# Patient Record
Sex: FEMALE | Race: BLACK OR AFRICAN AMERICAN | Employment: OTHER | URBAN - METROPOLITAN AREA
[De-identification: names, ages, dates, MRNs, and addresses within clinical notes are randomized per-mention and may not be internally consistent; named-entity substitution may affect disease eponyms.]

---

## 2017-03-09 ENCOUNTER — HOSPITAL ENCOUNTER (EMERGENCY)
Age: 82
Discharge: HOME | End: 2017-03-09
Attending: STUDENT IN AN ORGANIZED HEALTH CARE EDUCATION/TRAINING PROGRAM
Payer: MEDICARE

## 2017-03-09 ENCOUNTER — APPOINTMENT (OUTPATIENT)
Dept: GENERAL RADIOLOGY | Age: 82
End: 2017-03-09
Attending: STUDENT IN AN ORGANIZED HEALTH CARE EDUCATION/TRAINING PROGRAM
Payer: MEDICARE

## 2017-03-09 ENCOUNTER — APPOINTMENT (OUTPATIENT)
Dept: CT IMAGING | Age: 82
End: 2017-03-09
Attending: STUDENT IN AN ORGANIZED HEALTH CARE EDUCATION/TRAINING PROGRAM
Payer: MEDICARE

## 2017-03-09 VITALS
TEMPERATURE: 97.9 F | HEART RATE: 64 BPM | BODY MASS INDEX: 20.49 KG/M2 | HEIGHT: 64 IN | OXYGEN SATURATION: 100 % | WEIGHT: 120 LBS | SYSTOLIC BLOOD PRESSURE: 153 MMHG | DIASTOLIC BLOOD PRESSURE: 63 MMHG

## 2017-03-09 DIAGNOSIS — R42 DIZZINESS: Primary | ICD-10-CM

## 2017-03-09 LAB
ALBUMIN SERPL BCP-MCNC: 3.4 G/DL (ref 3.5–5)
ALBUMIN/GLOB SERPL: 0.7 {RATIO} (ref 1.1–2.2)
ALP SERPL-CCNC: 65 U/L (ref 45–117)
ALT SERPL-CCNC: 34 U/L (ref 12–78)
ANION GAP BLD CALC-SCNC: 8 MMOL/L (ref 5–15)
APPEARANCE UR: CLEAR
AST SERPL W P-5'-P-CCNC: 32 U/L (ref 15–37)
ATRIAL RATE: 63 BPM
BASOPHILS # BLD AUTO: 0 K/UL (ref 0–0.1)
BASOPHILS # BLD: 1 % (ref 0–1)
BILIRUB SERPL-MCNC: 0.1 MG/DL (ref 0.2–1)
BILIRUB UR QL: NEGATIVE
BUN SERPL-MCNC: 9 MG/DL (ref 6–20)
BUN/CREAT SERPL: 11 (ref 12–20)
CALCIUM SERPL-MCNC: 9.2 MG/DL (ref 8.5–10.1)
CALCULATED R AXIS, ECG10: -24 DEGREES
CALCULATED T AXIS, ECG11: 40 DEGREES
CHLORIDE SERPL-SCNC: 105 MMOL/L (ref 97–108)
CO2 SERPL-SCNC: 27 MMOL/L (ref 21–32)
COLOR UR: NORMAL
CREAT SERPL-MCNC: 0.82 MG/DL (ref 0.55–1.02)
DIAGNOSIS, 93000: NORMAL
EOSINOPHIL # BLD: 0.1 K/UL (ref 0–0.4)
EOSINOPHIL NFR BLD: 2 % (ref 0–7)
ERYTHROCYTE [DISTWIDTH] IN BLOOD BY AUTOMATED COUNT: 13.8 % (ref 11.5–14.5)
GLOBULIN SER CALC-MCNC: 4.9 G/DL (ref 2–4)
GLUCOSE SERPL-MCNC: 94 MG/DL (ref 65–100)
GLUCOSE UR STRIP.AUTO-MCNC: NEGATIVE MG/DL
HCT VFR BLD AUTO: 33.1 % (ref 35–47)
HGB BLD-MCNC: 10.3 G/DL (ref 11.5–16)
HGB UR QL STRIP: NEGATIVE
INR PPP: 1.1 (ref 0.9–1.1)
KETONES UR QL STRIP.AUTO: NEGATIVE MG/DL
LEUKOCYTE ESTERASE UR QL STRIP.AUTO: NEGATIVE
LYMPHOCYTES # BLD AUTO: 40 % (ref 12–49)
LYMPHOCYTES # BLD: 2.1 K/UL (ref 0.8–3.5)
MCH RBC QN AUTO: 29.7 PG (ref 26–34)
MCHC RBC AUTO-ENTMCNC: 31.1 G/DL (ref 30–36.5)
MCV RBC AUTO: 95.4 FL (ref 80–99)
MONOCYTES # BLD: 0.5 K/UL (ref 0–1)
MONOCYTES NFR BLD AUTO: 9 % (ref 5–13)
NEUTS SEG # BLD: 2.6 K/UL (ref 1.8–8)
NEUTS SEG NFR BLD AUTO: 48 % (ref 32–75)
NITRITE UR QL STRIP.AUTO: NEGATIVE
P-R INTERVAL, ECG05: 196 MS
PH UR STRIP: 7.5 [PH] (ref 5–8)
PHENYTOIN SERPL-MCNC: 6.3 UG/ML (ref 10–20)
PLATELET # BLD AUTO: 191 K/UL (ref 150–400)
POTASSIUM SERPL-SCNC: 3.8 MMOL/L (ref 3.5–5.1)
PROT SERPL-MCNC: 8.3 G/DL (ref 6.4–8.2)
PROT UR STRIP-MCNC: NEGATIVE MG/DL
PROTHROMBIN TIME: 10.6 SEC (ref 9–11.1)
Q-T INTERVAL, ECG07: 424 MS
QRS DURATION, ECG06: 84 MS
QTC CALCULATION (BEZET), ECG08: 467 MS
RBC # BLD AUTO: 3.47 M/UL (ref 3.8–5.2)
SODIUM SERPL-SCNC: 140 MMOL/L (ref 136–145)
SP GR UR REFRACTOMETRY: 1.01 (ref 1–1.03)
TROPONIN I SERPL-MCNC: <0.04 NG/ML
UROBILINOGEN UR QL STRIP.AUTO: 0.2 EU/DL (ref 0.2–1)
VENTRICULAR RATE, ECG03: 73 BPM
WBC # BLD AUTO: 5.3 K/UL (ref 3.6–11)

## 2017-03-09 PROCEDURE — 71010 XR CHEST PORT: CPT

## 2017-03-09 PROCEDURE — 80185 ASSAY OF PHENYTOIN TOTAL: CPT | Performed by: STUDENT IN AN ORGANIZED HEALTH CARE EDUCATION/TRAINING PROGRAM

## 2017-03-09 PROCEDURE — 85025 COMPLETE CBC W/AUTO DIFF WBC: CPT | Performed by: STUDENT IN AN ORGANIZED HEALTH CARE EDUCATION/TRAINING PROGRAM

## 2017-03-09 PROCEDURE — 80053 COMPREHEN METABOLIC PANEL: CPT | Performed by: STUDENT IN AN ORGANIZED HEALTH CARE EDUCATION/TRAINING PROGRAM

## 2017-03-09 PROCEDURE — 84484 ASSAY OF TROPONIN QUANT: CPT | Performed by: STUDENT IN AN ORGANIZED HEALTH CARE EDUCATION/TRAINING PROGRAM

## 2017-03-09 PROCEDURE — 70450 CT HEAD/BRAIN W/O DYE: CPT

## 2017-03-09 PROCEDURE — 99285 EMERGENCY DEPT VISIT HI MDM: CPT

## 2017-03-09 PROCEDURE — 93005 ELECTROCARDIOGRAM TRACING: CPT

## 2017-03-09 PROCEDURE — 81003 URINALYSIS AUTO W/O SCOPE: CPT | Performed by: STUDENT IN AN ORGANIZED HEALTH CARE EDUCATION/TRAINING PROGRAM

## 2017-03-09 PROCEDURE — 85610 PROTHROMBIN TIME: CPT | Performed by: STUDENT IN AN ORGANIZED HEALTH CARE EDUCATION/TRAINING PROGRAM

## 2017-03-09 PROCEDURE — 96360 HYDRATION IV INFUSION INIT: CPT

## 2017-03-09 PROCEDURE — 36415 COLL VENOUS BLD VENIPUNCTURE: CPT | Performed by: STUDENT IN AN ORGANIZED HEALTH CARE EDUCATION/TRAINING PROGRAM

## 2017-03-09 PROCEDURE — 74011250636 HC RX REV CODE- 250/636: Performed by: STUDENT IN AN ORGANIZED HEALTH CARE EDUCATION/TRAINING PROGRAM

## 2017-03-09 RX ORDER — FAMOTIDINE 20 MG/1
20 TABLET, FILM COATED ORAL 2 TIMES DAILY
Status: ON HOLD | COMMUNITY
End: 2020-11-09

## 2017-03-09 RX ORDER — CALCIUM CARBONATE/VITAMIN D3 600MG-5MCG
1 TABLET ORAL 2 TIMES DAILY WITH MEALS
Status: ON HOLD | COMMUNITY
End: 2020-11-09

## 2017-03-09 RX ADMIN — SODIUM CHLORIDE 500 ML: 900 INJECTION, SOLUTION INTRAVENOUS at 09:33

## 2017-03-09 NOTE — ED PROVIDER NOTES
HPI Comments: 79 yo F with PMH of seizure d/o on Dilantin, mild dementia, and HTN presents to the ED with granddaughter with CC of increasing dizziness. Worse over past 3 days compared to her baseline. No changes in medications. Pt denies h/a, fever, CP, cough, SOB, abd pain, dysuria, seizure, focal neuro deficit. At baseline, able to ambulate brief distances inside, outside she requires a walker. Was seen in the ED in [place], [place] on Tuesday and granddaughter tells me the work up, including Dilantin level, was normal.        Past Medical History:   Diagnosis Date    Dizziness     Essential hypertension     Glaucoma     NSVT [facility]nonsustained ventricular tachycardia) ([facility] 7/10/2012    Pacemaker     [facility]     [facility]     Stroke [facility])        Past Surgical History:   Procedure Laterality Date    HX PACEMAKER           History reviewed. No pertinent family history. Social History     Social History    Marital status: SINGLE     Spouse name: N/A    Number of children: N/A    Years of education: N/A     Occupational History    Not on file. Social History Main Topics    Smoking status: Never Smoker    Smokeless tobacco: Never Used    Alcohol use No    Drug use: No    Sexual activity: Not on file     Other Topics Concern    Not on file     Social History Narrative         ALLERGIES: Review of patient's allergies indicates no known allergies. Review of Systems   Constitutional: Negative for fever. Respiratory: Negative for chest tightness and shortness of breath. Cardiovascular: Negative for chest pain. Gastrointestinal: Positive for abdominal pain. Genitourinary: Negative for dysuria. Musculoskeletal: Negative for back pain. Skin: Negative for color change and pallor. Neurological: Positive for dizziness, weakness and light-headedness. Negative for seizures, syncope and speech difficulty. All other systems reviewed and are negative.       Vitals: 03/09/17 0710 03/09/17 0730 03/09/17 0731   BP: 199/83  145/80   Pulse: 64 69 69   Resp: 18 8 (!) 0   Temp: 97.9 °F (36.6 °C)     SpO2: 100% 100% 100%   Weight: 54.4 kg (120 lb)     Height: 5' 4\" (1.626 m)              Physical Exam   Constitutional: She appears well-developed and well-nourished. Elderly appearing   HENT:   Head: Atraumatic. Nose: Nose normal.   Mouth/Throat: Oropharynx is clear and moist.   Eyes: Conjunctivae and EOM are normal. Pupils are equal, round, and reactive to light. Neck: Normal range of motion. Neck supple. Cardiovascular: Normal rate, regular rhythm and normal heart sounds. No murmur heard. Pulmonary/Chest: Effort normal and breath sounds normal. No respiratory distress. Abdominal: Soft. Bowel sounds are normal. She exhibits no distension. There is no tenderness. There is no rebound. Musculoskeletal: Normal range of motion. She exhibits no edema. Neurological: She is alert. No cranial nerve deficit. She exhibits normal muscle tone. Coordination normal.   Skin: Skin is warm and dry. No rash noted. Psychiatric: She has a normal mood and affect. Her behavior is normal.   Nursing note and vitals reviewed.        MDM  ED Course       Procedures

## 2017-03-09 NOTE — ED NOTES
Pt reports dizziness from supine to sitting position. Pt's BP did not drop, Pt's HR dropped as noted. Pt has is not standing at this time for third orthostatic vs due to pt complaining of dizziness. Dr. Negrete Senior told. Pt difficult stick. NP Maryse Bonilla and Paramedic estella at bedside trying to IV.

## 2017-03-09 NOTE — ED NOTES
Pt discharged by Provider. Pt awaiting for grand daughter to come take her home. Pt reports feeling better.

## 2017-03-09 NOTE — ED NOTES
ED EKG interpretation:  Rhythm: Atrial Paced Rhythm with occasional PVCs; Rate (approx.): 73; No STEMI. Note written by Massiel Scott, as dictated by Raya Quispe MD 7:47 AM    PROGRESS NOTE:  10:09 AM  Reevaluated the pt who is feeling better and requesting to eat and be discharged.

## 2017-03-09 NOTE — DISCHARGE INSTRUCTIONS
We hope that we have addressed all of your medical concerns. The examination and treatment you received in the Emergency Department were for an emergent problem and were not intended as complete care. It is important that you follow up with your healthcare provider(s) for ongoing care. If your symptoms worsen or do not improve as expected, and you are unable to reach your usual health care provider(s), you should return to the Emergency Department. Today's healthcare is undergoing tremendous change, and patient satisfaction surveys are one of the many tools to assess the quality of medical care. You may receive a survey from the [facility] regarding your experience in the Emergency Department. I hope that your experience has been completely positive, particularly the medical care that I provided. As such, please participate in the survey; anything less than excellent does not meet my expectations or intentions. [facility] and [facility] participate in nationally recognized quality of care measures. If your blood pressure is greater than 120/80, as reported below, we urge that you seek medical care to address the potential of high blood pressure, commonly known as hypertension. Hypertension can be hereditary or can be caused by certain medical conditions, pain, stress, or \"white coat syndrome. \"       Please make an appointment with your health care provider(s) for follow up of your Emergency Department visit.        VITALS:   Patient Vitals for the past 8 hrs:   Temp Pulse Resp BP SpO2   03/09/17 0910 - 60 - - 99 %   03/09/17 0900 - 66 - 155/72 100 %   03/09/17 0845 - 70 - 162/81 99 %   03/09/17 0830 - 79 - 167/77 100 %   03/09/17 0815 - 65 - 158/71 100 %   03/09/17 0745 - 66 - 158/85 100 %   03/09/17 0731 - 69 (!) 0 145/80 100 %   03/09/17 0730 - 69 8 - 100 %   03/09/17 0710 97.9 °F (36.6 °C) 64 18 199/83 100 %          Thank you for allowing us to provide you with medical care today. We realize that you have many choices for your emergency care needs. Please choose us in the future for any continued health care needs. Terrencezeferino Meyer Sammy Wang, [address] [place], [place] [address].   Office: [phone]            Recent Results (from the past 24 hour(s))   URINALYSIS W/ RFLX MICROSCOPIC    Collection Time: 03/09/17  8:28 AM   Result Value Ref Range    Color YELLOW/STRAW      Appearance CLEAR CLEAR      Specific gravity 1.007 1.003 - 1.030      pH (UA) 7.5 5.0 - 8.0      Protein NEGATIVE  NEG mg/dL    Glucose NEGATIVE  NEG mg/dL    Ketone NEGATIVE  NEG mg/dL    Bilirubin NEGATIVE  NEG      Blood NEGATIVE  NEG      Urobilinogen 0.2 0.2 - 1.0 EU/dL    Nitrites NEGATIVE  NEG      Leukocyte Esterase NEGATIVE  NEG     CBC WITH AUTOMATED DIFF    Collection Time: 03/09/17  9:01 AM   Result Value Ref Range    WBC 5.3 3.6 - 11.0 K/uL    RBC 3.47 (L) 3.80 - 5.20 M/uL    HGB 10.3 (L) 11.5 - 16.0 g/dL    HCT 33.1 (L) 35.0 - 47.0 %    MCV 95.4 80.0 - 99.0 FL    MCH 29.7 26.0 - 34.0 PG    MCHC 31.1 30.0 - 36.5 g/dL    RDW 13.8 11.5 - 14.5 %    PLATELET 364 391 - 665 K/uL    NEUTROPHILS 48 32 - 75 %    LYMPHOCYTES 40 12 - 49 %    MONOCYTES 9 5 - 13 %    EOSINOPHILS 2 0 - 7 %    BASOPHILS 1 0 - 1 %    ABS. NEUTROPHILS 2.6 1.8 - 8.0 K/UL    ABS. LYMPHOCYTES 2.1 0.8 - 3.5 K/UL    ABS. MONOCYTES 0.5 0.0 - 1.0 K/UL    ABS. EOSINOPHILS 0.1 0.0 - 0.4 K/UL    ABS.  BASOPHILS 0.0 0.0 - 0.1 K/UL   PROTHROMBIN TIME + INR    Collection Time: 03/09/17  9:01 AM   Result Value Ref Range    INR 1.1 0.9 - 1.1      Prothrombin time 10.6 9.0 - 66.9 sec   METABOLIC PANEL, COMPREHENSIVE    Collection Time: 03/09/17  9:01 AM   Result Value Ref Range    Sodium 140 136 - 145 mmol/L    Potassium 3.8 3.5 - 5.1 mmol/L    Chloride 105 97 - 108 mmol/L    CO2 27 21 - 32 mmol/L    Anion gap 8 5 - 15 mmol/L    Glucose 94 65 - 100 mg/dL    BUN 9 6 - 20 MG/DL    Creatinine 0. 82 0.55 - 1.02 MG/DL    BUN/Creatinine ratio 11 (L) 12 - 20      GFR est AA >60 >60 ml/min/1.73m2    GFR est non-AA >60 >60 ml/min/1.73m2    Calcium 9.2 8.5 - 10.1 MG/DL    Bilirubin, total 0.1 (L) 0.2 - 1.0 MG/DL    ALT (SGPT) 34 12 - 78 U/L    AST (SGOT) 32 15 - 37 U/L    Alk. phosphatase 65 45 - 117 U/L    Protein, total 8.3 (H) 6.4 - 8.2 g/dL    Albumin 3.4 (L) 3.5 - 5.0 g/dL    Globulin 4.9 (H) 2.0 - 4.0 g/dL    A-G Ratio 0.7 (L) 1.1 - 2.2     TROPONIN I    Collection Time: 03/09/17  9:01 AM   Result Value Ref Range    Troponin-I, Qt. <0.04 <0.05 ng/mL   PHENYTOIN    Collection Time: 03/09/17  9:02 AM   Result Value Ref Range    Phenytoin 6.3 (L) 10.0 - 20.0 ug/mL       Ct Head Wo Cont    Result Date: 3/9/2017  EXAM:  CT HEAD WITHOUT CONTRAST INDICATION: Ataxia with stroke suspected as etiology. COMPARISON: None. CONTRAST: None. TECHNIQUE: Unenhanced CT of the head was performed using 5 mm images. Brain and bone windows were generated. Sagittal and coronal reformations were generated. CT dose reduction was achieved through use of a standardized protocol tailored for this examination and automatic exposure control for dose modulation. CT dose reduction was achieved through use of a standardized protocol tailored for this examination and automatic exposure control for dose modulation. FINDINGS: The ventricles and sulci are enlarged in a generalized fashion with ex vacuo dilatation of the right lateral ventricle. 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 There is patchy decreased attenuation elsewhere in the periventricular white matter which is nonspecific but consistent with small vessel disease. There is no intracranial hemorrhage. There is no extra-axial collection, mass, mass effect or midline shift. The basilar cisterns are open.   No acute infarct is identified. The bone windows demonstrate no abnormalities. The visualized portions of the paranasal sinuses and mastoid air cells are clear. IMPRESSION: Status post right temporal craniotomy and aneurysm clipping with right temporal and parietal encephalomalacia. No acute intracranial abnormality. Xr Chest Port    Result Date: 3/9/2017  Clinical history: Dizziness and dizziness INDICATION: Weakness and dizziness COMPARISON: 2010 FINDINGS: AP semiupright view of the chest is obtained . The cardiopericardial silhouette is stable. There is no pleural effusion, pneumothorax or focal consolidation present. Pacer. Patient on a cardiac monitor. IMPRESSION: No acute thoracic disease. Dizziness: Care Instructions  Your Care Instructions  Dizziness is the feeling of unsteadiness or fuzziness in your head. It is different than having vertigo, which is a feeling that the room is spinning or that you are moving or falling. It is also different from lightheadedness, which is the feeling that you are about to faint. It can be hard to know what causes dizziness. Some people feel dizzy when they have migraine headaches. Sometimes bouts of flu can make you feel dizzy. Some medical conditions, such as heart problems or high blood pressure, can make you feel dizzy. Many medicines can cause dizziness, including medicines for high blood pressure, pain, or anxiety. If a medicine causes your symptoms, your doctor may recommend that you stop or change the medicine. If it is a problem with your heart, you may need medicine to help your heart work better. If there is no clear reason for your symptoms, your doctor may suggest watching and waiting for a while to see if the dizziness goes away on its own. Follow-up care is a key part of your treatment and safety. Be sure to make and go to all appointments, and call your doctor if you are having problems.  It's also a good idea to know your test results and keep a list of the medicines you take. How can you care for yourself at home? · If your doctor recommends or prescribes medicine, take it exactly as directed. Call your doctor if you think you are having a problem with your medicine. · Do not drive while you feel dizzy. · Try to prevent falls. Steps you can take include:  ¨ Using nonskid mats, adding grab bars near the tub, and using night-lights. ¨ Clearing your home so that walkways are free of anything you might trip on. ¨ Letting family and friends know that you have been feeling dizzy. This will help them know how to help you. When should you call for help? Call 911 anytime you think you may need emergency care. For example, call if:  · You passed out (lost consciousness). · You have dizziness along with symptoms of a heart attack. These may include:  ¨ Chest pain or pressure, or a strange feeling in the chest.  ¨ Sweating. ¨ Shortness of breath. ¨ Nausea or vomiting. ¨ Pain, pressure, or a strange feeling in the back, neck, jaw, or upper belly or in one or both shoulders or arms. ¨ Lightheadedness or sudden weakness. ¨ A fast or irregular heartbeat. · You have symptoms of a stroke. These may include:  ¨ Sudden numbness, tingling, weakness, or loss of movement in your face, arm, or leg, especially on only one side of your body. ¨ Sudden vision changes. ¨ Sudden trouble speaking. ¨ Sudden confusion or trouble understanding simple statements. ¨ Sudden problems with walking or balance. ¨ A sudden, severe headache that is different from past headaches. Call your doctor now or seek immediate medical care if:  · You feel dizzy and have a fever, headache, or ringing in your ears. · You have new or increased nausea and vomiting. · Your dizziness does not go away or comes back. Watch closely for changes in your health, and be sure to contact your doctor if:  · You do not get better as expected. Where can you learn more?   Go to http://tonio-grazyna.info/. Enter G776 in the search box to learn more about \"Dizziness: Care Instructions. \"  Current as of: May 27, 2016  Content Version: 11.1  © 0002-4941 [employer]. Care instructions adapted under license by [employer] (which disclaims liability or warranty for this information). If you have questions about a medical condition or this instruction, always ask your healthcare professional. [place] [address] any warranty or liability for your use of this information.

## 2017-03-29 ENCOUNTER — CLINICAL SUPPORT (OUTPATIENT)
Dept: CARDIOLOGY CLINIC | Age: 82
End: 2017-03-29

## 2017-03-29 DIAGNOSIS — Z95.0 CARDIAC PACEMAKER IN SITU: Primary | ICD-10-CM

## 2017-07-24 ENCOUNTER — CLINICAL SUPPORT (OUTPATIENT)
Dept: CARDIOLOGY CLINIC | Age: 82
End: 2017-07-24

## 2017-07-24 DIAGNOSIS — Z95.0 CARDIAC PACEMAKER IN SITU: Primary | ICD-10-CM

## 2017-10-30 ENCOUNTER — CLINICAL SUPPORT (OUTPATIENT)
Dept: CARDIOLOGY CLINIC | Age: 82
End: 2017-10-30

## 2017-10-30 DIAGNOSIS — Z95.0 CARDIAC PACEMAKER IN SITU: Primary | ICD-10-CM

## 2018-01-31 ENCOUNTER — CLINICAL SUPPORT (OUTPATIENT)
Dept: CARDIOLOGY CLINIC | Age: 83
End: 2018-01-31

## 2018-01-31 DIAGNOSIS — Z95.0 CARDIAC PACEMAKER IN SITU: Primary | ICD-10-CM

## 2018-05-02 ENCOUNTER — OFFICE VISIT (OUTPATIENT)
Dept: CARDIOLOGY CLINIC | Age: 83
End: 2018-05-02

## 2018-05-02 DIAGNOSIS — Z95.0 CARDIAC PACEMAKER IN SITU: Primary | ICD-10-CM

## 2018-08-08 ENCOUNTER — CLINICAL SUPPORT (OUTPATIENT)
Dept: CARDIOLOGY CLINIC | Age: 83
End: 2018-08-08

## 2018-08-08 DIAGNOSIS — Z95.0 CARDIAC PACEMAKER IN SITU: Primary | ICD-10-CM

## 2018-11-14 ENCOUNTER — CLINICAL SUPPORT (OUTPATIENT)
Dept: CARDIOLOGY CLINIC | Age: 83
End: 2018-11-14

## 2018-11-14 DIAGNOSIS — Z95.0 CARDIAC PACEMAKER IN SITU: Primary | ICD-10-CM

## 2019-02-20 ENCOUNTER — CLINICAL SUPPORT (OUTPATIENT)
Dept: CARDIOLOGY CLINIC | Age: 84
End: 2019-02-20

## 2019-02-20 DIAGNOSIS — Z95.0 CARDIAC PACEMAKER IN SITU: Primary | ICD-10-CM

## 2019-05-22 ENCOUNTER — CLINICAL SUPPORT (OUTPATIENT)
Dept: CARDIOLOGY CLINIC | Age: 84
End: 2019-05-22

## 2019-05-22 DIAGNOSIS — Z95.0 CARDIAC PACEMAKER IN SITU: Primary | ICD-10-CM

## 2019-08-21 ENCOUNTER — CLINICAL SUPPORT (OUTPATIENT)
Dept: CARDIOLOGY CLINIC | Age: 84
End: 2019-08-21

## 2019-08-21 DIAGNOSIS — Z95.0 CARDIAC PACEMAKER IN SITU: Primary | ICD-10-CM

## 2019-11-25 ENCOUNTER — CLINICAL SUPPORT (OUTPATIENT)
Dept: CARDIOLOGY CLINIC | Age: 84
End: 2019-11-25

## 2019-11-25 DIAGNOSIS — Z95.0 CARDIAC PACEMAKER IN SITU: Primary | ICD-10-CM

## 2020-02-26 ENCOUNTER — CLINICAL SUPPORT (OUTPATIENT)
Dept: CARDIOLOGY CLINIC | Age: 85
End: 2020-02-26

## 2020-02-26 DIAGNOSIS — Z95.0 CARDIAC PACEMAKER IN SITU: Primary | ICD-10-CM

## 2020-05-26 ENCOUNTER — TELEPHONE (OUTPATIENT)
Dept: CARDIOLOGY CLINIC | Age: 85
End: 2020-05-26

## 2020-11-08 ENCOUNTER — HOSPITAL ENCOUNTER (INPATIENT)
Age: 85
LOS: 3 days | Discharge: HOSPICE | DRG: 853 | End: 2020-11-12
Attending: EMERGENCY MEDICINE | Admitting: HOSPITALIST
Payer: MEDICARE

## 2020-11-08 ENCOUNTER — APPOINTMENT (OUTPATIENT)
Dept: GENERAL RADIOLOGY | Age: 85
DRG: 853 | End: 2020-11-08
Attending: EMERGENCY MEDICINE
Payer: MEDICARE

## 2020-11-08 DIAGNOSIS — F03.C0 ADVANCED DEMENTIA: ICD-10-CM

## 2020-11-08 DIAGNOSIS — R62.7 ADULT FAILURE TO THRIVE: ICD-10-CM

## 2020-11-08 DIAGNOSIS — L89.154 SACRAL DECUBITUS ULCER, STAGE IV (HCC): Primary | ICD-10-CM

## 2020-11-08 DIAGNOSIS — L03.317 CELLULITIS OF BUTTOCK: ICD-10-CM

## 2020-11-08 DIAGNOSIS — E86.0 DEHYDRATION: ICD-10-CM

## 2020-11-08 DIAGNOSIS — M86.9 OSTEOMYELITIS, UNSPECIFIED SITE, UNSPECIFIED TYPE (HCC): ICD-10-CM

## 2020-11-08 DIAGNOSIS — Z71.89 GOALS OF CARE, COUNSELING/DISCUSSION: ICD-10-CM

## 2020-11-08 DIAGNOSIS — R78.81 BACTEREMIA: ICD-10-CM

## 2020-11-08 DIAGNOSIS — R64 CACHEXIA (HCC): ICD-10-CM

## 2020-11-08 DIAGNOSIS — E46 HYPOALBUMINEMIA DUE TO PROTEIN-CALORIE MALNUTRITION (HCC): ICD-10-CM

## 2020-11-08 DIAGNOSIS — E88.09 HYPOALBUMINEMIA DUE TO PROTEIN-CALORIE MALNUTRITION (HCC): ICD-10-CM

## 2020-11-08 DIAGNOSIS — J90 PLEURAL EFFUSION: ICD-10-CM

## 2020-11-08 PROCEDURE — 99285 EMERGENCY DEPT VISIT HI MDM: CPT

## 2020-11-08 PROCEDURE — 75810000455 HC PLCMT CENT VENOUS CATH LVL 2 5182

## 2020-11-08 PROCEDURE — 93005 ELECTROCARDIOGRAM TRACING: CPT

## 2020-11-08 PROCEDURE — 99284 EMERGENCY DEPT VISIT MOD MDM: CPT

## 2020-11-08 PROCEDURE — 87040 BLOOD CULTURE FOR BACTERIA: CPT

## 2020-11-08 PROCEDURE — 36415 COLL VENOUS BLD VENIPUNCTURE: CPT

## 2020-11-08 PROCEDURE — 71045 X-RAY EXAM CHEST 1 VIEW: CPT

## 2020-11-08 RX ORDER — SODIUM CHLORIDE 0.9 % (FLUSH) 0.9 %
5-10 SYRINGE (ML) INJECTION AS NEEDED
Status: DISCONTINUED | OUTPATIENT
Start: 2020-11-08 | End: 2020-11-12 | Stop reason: HOSPADM

## 2020-11-09 ENCOUNTER — APPOINTMENT (OUTPATIENT)
Dept: CT IMAGING | Age: 85
DRG: 853 | End: 2020-11-09
Attending: EMERGENCY MEDICINE
Payer: MEDICARE

## 2020-11-09 PROBLEM — D72.829 LEUKOCYTOSIS: Status: ACTIVE | Noted: 2020-11-09

## 2020-11-09 PROBLEM — A41.9 SEPSIS (HCC): Status: ACTIVE | Noted: 2020-11-09

## 2020-11-09 PROBLEM — E86.0 DEHYDRATION: Status: ACTIVE | Noted: 2020-11-09

## 2020-11-09 PROBLEM — E43 SEVERE PROTEIN-CALORIE MALNUTRITION (HCC): Status: ACTIVE | Noted: 2020-11-09

## 2020-11-09 PROBLEM — R62.7 FAILURE TO THRIVE IN ADULT: Status: ACTIVE | Noted: 2020-11-09

## 2020-11-09 PROBLEM — D64.9 ANEMIA: Status: ACTIVE | Noted: 2020-11-09

## 2020-11-09 PROBLEM — J90 PLEURAL EFFUSION: Status: ACTIVE | Noted: 2020-11-09

## 2020-11-09 PROBLEM — L89.154 DECUBITUS ULCER OF SACRAL REGION, STAGE 4 (HCC): Status: ACTIVE | Noted: 2020-11-09

## 2020-11-09 PROBLEM — L03.317 CELLULITIS OF MULTIPLE SITES OF BUTTOCK: Status: ACTIVE | Noted: 2020-11-09

## 2020-11-09 PROBLEM — F03.90 DEMENTIA (HCC): Status: ACTIVE | Noted: 2020-11-09

## 2020-11-09 PROBLEM — R00.0 SINUS TACHYCARDIA: Status: ACTIVE | Noted: 2020-11-09

## 2020-11-09 PROBLEM — G93.41 ACUTE METABOLIC ENCEPHALOPATHY: Status: ACTIVE | Noted: 2020-11-09

## 2020-11-09 LAB
ALBUMIN SERPL-MCNC: 1.5 G/DL (ref 3.5–5)
ALBUMIN SERPL-MCNC: 1.7 G/DL (ref 3.5–5)
ALBUMIN/GLOB SERPL: 0.3 {RATIO} (ref 1.1–2.2)
ALBUMIN/GLOB SERPL: 0.3 {RATIO} (ref 1.1–2.2)
ALP SERPL-CCNC: 76 U/L (ref 45–117)
ALP SERPL-CCNC: 83 U/L (ref 45–117)
ALT SERPL-CCNC: 23 U/L (ref 12–78)
ALT SERPL-CCNC: 28 U/L (ref 12–78)
ANION GAP SERPL CALC-SCNC: 5 MMOL/L (ref 5–15)
ANION GAP SERPL CALC-SCNC: 5 MMOL/L (ref 5–15)
AST SERPL-CCNC: 40 U/L (ref 15–37)
AST SERPL-CCNC: 47 U/L (ref 15–37)
ATRIAL RATE: 118 BPM
BASOPHILS # BLD: 0.1 K/UL (ref 0–0.1)
BASOPHILS NFR BLD: 0 % (ref 0–1)
BILIRUB SERPL-MCNC: 0.3 MG/DL (ref 0.2–1)
BILIRUB SERPL-MCNC: 0.3 MG/DL (ref 0.2–1)
BUN SERPL-MCNC: 25 MG/DL (ref 6–20)
BUN SERPL-MCNC: 26 MG/DL (ref 6–20)
BUN/CREAT SERPL: 32 (ref 12–20)
BUN/CREAT SERPL: 35 (ref 12–20)
CALCIUM SERPL-MCNC: 8.3 MG/DL (ref 8.5–10.1)
CALCIUM SERPL-MCNC: 8.9 MG/DL (ref 8.5–10.1)
CALCULATED P AXIS, ECG09: 92 DEGREES
CALCULATED R AXIS, ECG10: -25 DEGREES
CALCULATED T AXIS, ECG11: 87 DEGREES
CHLORIDE SERPL-SCNC: 112 MMOL/L (ref 97–108)
CHLORIDE SERPL-SCNC: 118 MMOL/L (ref 97–108)
CO2 SERPL-SCNC: 22 MMOL/L (ref 21–32)
CO2 SERPL-SCNC: 26 MMOL/L (ref 21–32)
COMMENT, HOLDF: NORMAL
CREAT SERPL-MCNC: 0.72 MG/DL (ref 0.55–1.02)
CREAT SERPL-MCNC: 0.81 MG/DL (ref 0.55–1.02)
DIAGNOSIS, 93000: NORMAL
DIFFERENTIAL METHOD BLD: ABNORMAL
EOSINOPHIL # BLD: 0 K/UL (ref 0–0.4)
EOSINOPHIL NFR BLD: 0 % (ref 0–7)
ERYTHROCYTE [DISTWIDTH] IN BLOOD BY AUTOMATED COUNT: 17.3 % (ref 11.5–14.5)
GLOBULIN SER CALC-MCNC: 5.9 G/DL (ref 2–4)
GLOBULIN SER CALC-MCNC: 6.4 G/DL (ref 2–4)
GLUCOSE SERPL-MCNC: 102 MG/DL (ref 65–100)
GLUCOSE SERPL-MCNC: 102 MG/DL (ref 65–100)
HCT VFR BLD AUTO: 24.7 % (ref 35–47)
HGB BLD-MCNC: 7.7 G/DL (ref 11.5–16)
IMM GRANULOCYTES # BLD AUTO: 0.1 K/UL (ref 0–0.04)
IMM GRANULOCYTES NFR BLD AUTO: 1 % (ref 0–0.5)
LACTATE SERPL-SCNC: 1.1 MMOL/L (ref 0.4–2)
LYMPHOCYTES # BLD: 1.3 K/UL (ref 0.8–3.5)
LYMPHOCYTES NFR BLD: 7 % (ref 12–49)
MCH RBC QN AUTO: 29.7 PG (ref 26–34)
MCHC RBC AUTO-ENTMCNC: 31.2 G/DL (ref 30–36.5)
MCV RBC AUTO: 95.4 FL (ref 80–99)
MONOCYTES # BLD: 1.3 K/UL (ref 0–1)
MONOCYTES NFR BLD: 7 % (ref 5–13)
NEUTS SEG # BLD: 15.5 K/UL (ref 1.8–8)
NEUTS SEG NFR BLD: 85 % (ref 32–75)
NRBC # BLD: 0 K/UL (ref 0–0.01)
NRBC BLD-RTO: 0 PER 100 WBC
P-R INTERVAL, ECG05: 116 MS
PLATELET # BLD AUTO: 359 K/UL (ref 150–400)
PMV BLD AUTO: 10.2 FL (ref 8.9–12.9)
POTASSIUM SERPL-SCNC: 3.7 MMOL/L (ref 3.5–5.1)
POTASSIUM SERPL-SCNC: 3.8 MMOL/L (ref 3.5–5.1)
PROCALCITONIN SERPL-MCNC: 0.15 NG/ML
PROT SERPL-MCNC: 7.4 G/DL (ref 6.4–8.2)
PROT SERPL-MCNC: 8.1 G/DL (ref 6.4–8.2)
Q-T INTERVAL, ECG07: 324 MS
QRS DURATION, ECG06: 78 MS
QTC CALCULATION (BEZET), ECG08: 454 MS
RBC # BLD AUTO: 2.59 M/UL (ref 3.8–5.2)
SAMPLES BEING HELD,HOLD: NORMAL
SODIUM SERPL-SCNC: 143 MMOL/L (ref 136–145)
SODIUM SERPL-SCNC: 145 MMOL/L (ref 136–145)
VENTRICULAR RATE, ECG03: 118 BPM
WBC # BLD AUTO: 18.3 K/UL (ref 3.6–11)

## 2020-11-09 PROCEDURE — 70450 CT HEAD/BRAIN W/O DYE: CPT

## 2020-11-09 PROCEDURE — 96374 THER/PROPH/DIAG INJ IV PUSH: CPT

## 2020-11-09 PROCEDURE — 74011250636 HC RX REV CODE- 250/636: Performed by: INTERNAL MEDICINE

## 2020-11-09 PROCEDURE — 36415 COLL VENOUS BLD VENIPUNCTURE: CPT

## 2020-11-09 PROCEDURE — 97530 THERAPEUTIC ACTIVITIES: CPT

## 2020-11-09 PROCEDURE — C9113 INJ PANTOPRAZOLE SODIUM, VIA: HCPCS | Performed by: HOSPITALIST

## 2020-11-09 PROCEDURE — 74176 CT ABD & PELVIS W/O CONTRAST: CPT

## 2020-11-09 PROCEDURE — 94760 N-INVAS EAR/PLS OXIMETRY 1: CPT

## 2020-11-09 PROCEDURE — 74011000250 HC RX REV CODE- 250: Performed by: EMERGENCY MEDICINE

## 2020-11-09 PROCEDURE — 97161 PT EVAL LOW COMPLEX 20 MIN: CPT

## 2020-11-09 PROCEDURE — 84145 PROCALCITONIN (PCT): CPT

## 2020-11-09 PROCEDURE — 74011250636 HC RX REV CODE- 250/636: Performed by: HOSPITALIST

## 2020-11-09 PROCEDURE — 97165 OT EVAL LOW COMPLEX 30 MIN: CPT

## 2020-11-09 PROCEDURE — 96375 TX/PRO/DX INJ NEW DRUG ADDON: CPT

## 2020-11-09 PROCEDURE — 65270000029 HC RM PRIVATE

## 2020-11-09 PROCEDURE — 80053 COMPREHEN METABOLIC PANEL: CPT

## 2020-11-09 PROCEDURE — 65660000000 HC RM CCU STEPDOWN

## 2020-11-09 PROCEDURE — 87186 SC STD MICRODIL/AGAR DIL: CPT

## 2020-11-09 PROCEDURE — 87040 BLOOD CULTURE FOR BACTERIA: CPT

## 2020-11-09 PROCEDURE — 92610 EVALUATE SWALLOWING FUNCTION: CPT

## 2020-11-09 PROCEDURE — 97535 SELF CARE MNGMENT TRAINING: CPT

## 2020-11-09 PROCEDURE — 83605 ASSAY OF LACTIC ACID: CPT

## 2020-11-09 PROCEDURE — 87205 SMEAR GRAM STAIN: CPT

## 2020-11-09 PROCEDURE — 77030038269 HC DRN EXT URIN PURWCK BARD -A

## 2020-11-09 PROCEDURE — 87077 CULTURE AEROBIC IDENTIFY: CPT

## 2020-11-09 PROCEDURE — 74011000250 HC RX REV CODE- 250: Performed by: HOSPITALIST

## 2020-11-09 PROCEDURE — 51798 US URINE CAPACITY MEASURE: CPT

## 2020-11-09 PROCEDURE — 85025 COMPLETE CBC W/AUTO DIFF WBC: CPT

## 2020-11-09 PROCEDURE — 74011250636 HC RX REV CODE- 250/636: Performed by: EMERGENCY MEDICINE

## 2020-11-09 PROCEDURE — 74011250637 HC RX REV CODE- 250/637: Performed by: INTERNAL MEDICINE

## 2020-11-09 PROCEDURE — 74011000250 HC RX REV CODE- 250: Performed by: INTERNAL MEDICINE

## 2020-11-09 PROCEDURE — 99233 SBSQ HOSP IP/OBS HIGH 50: CPT | Performed by: INTERNAL MEDICINE

## 2020-11-09 RX ORDER — GUAIFENESIN 100 MG/5ML
81 LIQUID (ML) ORAL DAILY
COMMUNITY
End: 2020-11-12

## 2020-11-09 RX ORDER — LATANOPROST 50 UG/ML
1 SOLUTION/ DROPS OPHTHALMIC
Status: DISCONTINUED | OUTPATIENT
Start: 2020-11-09 | End: 2020-11-12 | Stop reason: HOSPADM

## 2020-11-09 RX ORDER — SODIUM CHLORIDE 0.9 % (FLUSH) 0.9 %
5-40 SYRINGE (ML) INJECTION EVERY 8 HOURS
Status: DISCONTINUED | OUTPATIENT
Start: 2020-11-09 | End: 2020-11-12 | Stop reason: HOSPADM

## 2020-11-09 RX ORDER — PHENYTOIN SODIUM 100 MG/1
100 CAPSULE, EXTENDED RELEASE ORAL
COMMUNITY

## 2020-11-09 RX ORDER — ACETAMINOPHEN 650 MG/1
650 SUPPOSITORY RECTAL
Status: DISCONTINUED | OUTPATIENT
Start: 2020-11-09 | End: 2020-11-12 | Stop reason: HOSPADM

## 2020-11-09 RX ORDER — CHOLECALCIFEROL TAB 125 MCG (5000 UNIT) 125 MCG
5000 TAB ORAL DAILY
COMMUNITY
End: 2020-11-12

## 2020-11-09 RX ORDER — AMOXICILLIN 250 MG
1 CAPSULE ORAL EVERY EVENING
COMMUNITY

## 2020-11-09 RX ORDER — PANTOPRAZOLE SODIUM 40 MG/10ML
40 INJECTION, POWDER, LYOPHILIZED, FOR SOLUTION INTRAVENOUS EVERY 24 HOURS
Status: DISCONTINUED | OUTPATIENT
Start: 2020-11-09 | End: 2020-11-09

## 2020-11-09 RX ORDER — CLINDAMYCIN PHOSPHATE 600 MG/50ML
600 INJECTION, SOLUTION INTRAVENOUS EVERY 8 HOURS
Status: DISCONTINUED | OUTPATIENT
Start: 2020-11-09 | End: 2020-11-09

## 2020-11-09 RX ORDER — PHENYTOIN SODIUM 100 MG/1
200 CAPSULE, EXTENDED RELEASE ORAL DAILY
Status: DISCONTINUED | OUTPATIENT
Start: 2020-11-09 | End: 2020-11-12 | Stop reason: HOSPADM

## 2020-11-09 RX ORDER — LATANOPROST 50 UG/ML
1 SOLUTION/ DROPS OPHTHALMIC
COMMUNITY

## 2020-11-09 RX ORDER — MORPHINE SULFATE 2 MG/ML
1 INJECTION, SOLUTION INTRAMUSCULAR; INTRAVENOUS
Status: DISCONTINUED | OUTPATIENT
Start: 2020-11-09 | End: 2020-11-12 | Stop reason: HOSPADM

## 2020-11-09 RX ORDER — SIMVASTATIN 20 MG/1
20 TABLET, FILM COATED ORAL
COMMUNITY
End: 2020-11-12

## 2020-11-09 RX ORDER — METRONIDAZOLE 500 MG/100ML
500 INJECTION, SOLUTION INTRAVENOUS EVERY 12 HOURS
Status: DISCONTINUED | OUTPATIENT
Start: 2020-11-09 | End: 2020-11-12 | Stop reason: HOSPADM

## 2020-11-09 RX ORDER — BRIMONIDINE TARTRATE 2 MG/ML
1 SOLUTION/ DROPS OPHTHALMIC 3 TIMES DAILY
Status: DISCONTINUED | OUTPATIENT
Start: 2020-11-09 | End: 2020-11-12 | Stop reason: HOSPADM

## 2020-11-09 RX ORDER — PHENYTOIN SODIUM 100 MG/1
200 CAPSULE, EXTENDED RELEASE ORAL DAILY
COMMUNITY

## 2020-11-09 RX ORDER — DIPHENHYDRAMINE HCL 25 MG
1 TABLET,DISINTEGRATING ORAL EVERY EVENING
COMMUNITY
End: 2020-11-12

## 2020-11-09 RX ORDER — MAGNESIUM HYDROXIDE 400 MG/5ML
1 SUSPENSION, ORAL (FINAL DOSE FORM) ORAL DAILY
COMMUNITY
End: 2020-11-12

## 2020-11-09 RX ORDER — SODIUM CHLORIDE AND POTASSIUM CHLORIDE .9; .15 G/100ML; G/100ML
SOLUTION INTRAVENOUS CONTINUOUS
Status: DISCONTINUED | OUTPATIENT
Start: 2020-11-09 | End: 2020-11-10

## 2020-11-09 RX ORDER — FAMOTIDINE 20 MG/1
20 TABLET, FILM COATED ORAL DAILY
Status: DISCONTINUED | OUTPATIENT
Start: 2020-11-09 | End: 2020-11-12 | Stop reason: HOSPADM

## 2020-11-09 RX ORDER — BRIMONIDINE TARTRATE 2 MG/ML
1 SOLUTION/ DROPS OPHTHALMIC 3 TIMES DAILY
COMMUNITY

## 2020-11-09 RX ORDER — SODIUM CHLORIDE 0.9 % (FLUSH) 0.9 %
5-40 SYRINGE (ML) INJECTION AS NEEDED
Status: DISCONTINUED | OUTPATIENT
Start: 2020-11-09 | End: 2020-11-12 | Stop reason: HOSPADM

## 2020-11-09 RX ORDER — DORZOLAMIDE HCL 20 MG/ML
1 SOLUTION/ DROPS OPHTHALMIC 2 TIMES DAILY
Status: DISCONTINUED | OUTPATIENT
Start: 2020-11-09 | End: 2020-11-12 | Stop reason: HOSPADM

## 2020-11-09 RX ORDER — DONEPEZIL HYDROCHLORIDE 5 MG/1
10 TABLET, FILM COATED ORAL
Status: DISCONTINUED | OUTPATIENT
Start: 2020-11-09 | End: 2020-11-12 | Stop reason: HOSPADM

## 2020-11-09 RX ORDER — ACETAMINOPHEN 325 MG/1
650 TABLET ORAL
Status: DISCONTINUED | OUTPATIENT
Start: 2020-11-09 | End: 2020-11-12 | Stop reason: HOSPADM

## 2020-11-09 RX ORDER — POLYETHYLENE GLYCOL 3350 17 G/17G
17 POWDER, FOR SOLUTION ORAL DAILY PRN
Status: DISCONTINUED | OUTPATIENT
Start: 2020-11-09 | End: 2020-11-12 | Stop reason: HOSPADM

## 2020-11-09 RX ORDER — PHENYTOIN SODIUM 100 MG/1
100 CAPSULE, EXTENDED RELEASE ORAL
Status: DISCONTINUED | OUTPATIENT
Start: 2020-11-09 | End: 2020-11-12 | Stop reason: HOSPADM

## 2020-11-09 RX ADMIN — Medication 10 ML: at 06:12

## 2020-11-09 RX ADMIN — BRIMONIDINE TARTRATE 1 DROP: 2 SOLUTION OPHTHALMIC at 22:00

## 2020-11-09 RX ADMIN — CEFEPIME HYDROCHLORIDE 2 G: 2 INJECTION, POWDER, FOR SOLUTION INTRAVENOUS at 01:22

## 2020-11-09 RX ADMIN — PANTOPRAZOLE SODIUM 40 MG: 40 INJECTION, POWDER, FOR SOLUTION INTRAVENOUS at 06:11

## 2020-11-09 RX ADMIN — METRONIDAZOLE 500 MG: 500 INJECTION, SOLUTION INTRAVENOUS at 12:19

## 2020-11-09 RX ADMIN — DORZOLAMIDE HYDROCHLORIDE 1 DROP: 20 SOLUTION/ DROPS OPHTHALMIC at 12:19

## 2020-11-09 RX ADMIN — DONEPEZIL HYDROCHLORIDE 10 MG: 5 TABLET, FILM COATED ORAL at 21:56

## 2020-11-09 RX ADMIN — BRIMONIDINE TARTRATE 1 DROP: 2 SOLUTION OPHTHALMIC at 16:25

## 2020-11-09 RX ADMIN — CLINDAMYCIN PHOSPHATE 600 MG: 600 INJECTION, SOLUTION INTRAVENOUS at 06:10

## 2020-11-09 RX ADMIN — POTASSIUM CHLORIDE AND SODIUM CHLORIDE: 900; 150 INJECTION, SOLUTION INTRAVENOUS at 16:27

## 2020-11-09 RX ADMIN — POTASSIUM CHLORIDE AND SODIUM CHLORIDE: 900; 150 INJECTION, SOLUTION INTRAVENOUS at 05:41

## 2020-11-09 RX ADMIN — PHENYTOIN SODIUM 100 MG: 100 CAPSULE ORAL at 21:58

## 2020-11-09 RX ADMIN — CEFEPIME 2 G: 2 INJECTION, POWDER, FOR SOLUTION INTRAVENOUS at 12:19

## 2020-11-09 RX ADMIN — METRONIDAZOLE 500 MG: 500 INJECTION, SOLUTION INTRAVENOUS at 21:46

## 2020-11-09 RX ADMIN — LATANOPROST 1 DROP: 50 SOLUTION OPHTHALMIC at 22:00

## 2020-11-09 RX ADMIN — Medication 20 ML: at 14:00

## 2020-11-09 RX ADMIN — VANCOMYCIN HYDROCHLORIDE 1000 MG: 1 INJECTION, POWDER, LYOPHILIZED, FOR SOLUTION INTRAVENOUS at 01:29

## 2020-11-09 RX ADMIN — FAMOTIDINE 20 MG: 20 TABLET, FILM COATED ORAL at 12:19

## 2020-11-09 RX ADMIN — DORZOLAMIDE HYDROCHLORIDE 1 DROP: 20 SOLUTION/ DROPS OPHTHALMIC at 22:00

## 2020-11-09 RX ADMIN — PHENYTOIN SODIUM 200 MG: 100 CAPSULE ORAL at 12:19

## 2020-11-09 NOTE — PROGRESS NOTES
[facility] Dosing Services: Antimicrobial Stewardship Progress Note    Consult for antibiotic dosing of Vancomycin by Dr. Jcarlos Nino and Cefepime by renal protocol. Pharmacist reviewed antibiotic appropriateness for 80year old , female  for indication of multiple decubitus ulcers/sepsis  Day of Therapy 1    Plan:  Vancomycin therapy:  Start Vancomycin therapy, with loading dose of 1000 (mg) at 0129 11/09/20. Follow with maintenance dose of : by random level. Dose calculated to approximate a therapeutic trough of 15-20 mcg/mL. Last trough level / Plan for level:   Pharmacy to follow daily and will make changes to dose and/or frequency based on clinical status. Date Dose & Interval Measured (mcg/mL) Extrapolated (mcg/mL)   ? ? ? ?   ? ? ? ?   ? ? ? ? Non-Kinetic Antimicrobial Dosing:   Current Regimen:   Cefepime 2 grams IV q12h  Recommendation:   Other Antimicrobial  (not dosed by pharmacist)      Cultures        Serum Creatinine     Lab Results   Component Value Date/Time    Creatinine 0.81 11/09/2020 01:01 AM    Creatinine 0.72 11/09/2020 01:01 AM       Creatinine Clearance Estimated Creatinine Clearance: 30.8 mL/min (based on SCr of 0.81 mg/dL).      Procalcitonin  No results found for: PCT     Temp   97.5 °F (36.4 °C)    WBC   Lab Results   Component Value Date/Time    WBC 18.3 (H) 11/09/2020 01:01 AM       H/H   Lab Results   Component Value Date/Time    HGB 7.7 (L) 11/09/2020 01:01 AM      Platelets Lab Results   Component Value Date/Time    PLATELET 327 27/58/1511 01:01 AM            Pharmacist: Dominic information: [phone]

## 2020-11-09 NOTE — PROGRESS NOTES
Patient has a pharyngeal swallow, but oral phase is severely delayed. NOT functional for meds and not efficient for PO. Feeding her by mouth will most likely take hours, even if she would accept it due to severity of oral holding. Ok for sips for comfort when alert and upright. Will discuss with MD.   RN reports pharmacy trying to convert all meds to IV for now.

## 2020-11-09 NOTE — CONSULTS
[facility]  Pulmonary, Critical Care, and Sleep Medicine    Initial Patient Consult    Name: Mark Fair MRN: [id number]   : 1926 Hospital: [address]   Date: 2020        IMPRESSION:   · Sacral decubitus ulcer w/ concern for osteo  · L pleural effusion  · Failure to thrive  · Ams, h/o dementia  · Dysphagia  · H/o CVA  · H/o SSS s/p PPM  · H/o seizures      RECOMMENDATIONS:   · Supplemental O2 as needed to keep sats > 90%  · abx as per ID  · Would hold off on thoracentesis until goals of care further discussed with the family as the patient is currently on RA and in no respiratory distress. Can reconsider thora at a later time if she becomes symptomatic, the effusion is felt to be infected and/or the family does not plan to transition to hospice    Thank you for the consult  Will follow      Subjective:     Ms. Troy Real is a 79yo male w/ history of dementia, CVA, SSS s/p PPM, seizures and HTN who presented to the ER on  for evaluation of sarcral wounds w/ concern for infection. CT abd/pelvis showed sacral decubitus wounds that extend to the pubic ramus. It also shows a L sided pleural effusion. She does not appear to have any difficulty breathing currently and is not requiring any supplemental O2. Speech has seen the patient today: per their notes she does not have a functional swallow for meds and her swallow is not efficient enough for PO. Past Medical History:   Diagnosis Date    Dizziness     Essential hypertension     Glaucoma     Hx of completed stroke     NSVT (nonsustained ventricular tachycardia) ([facility] [address] 7/10/2012    Pacemaker     Seizure disorder ([facility] [address]     Sick sinus syndrome ([facility] [address]       Past Surgical History:   Procedure Laterality Date    HX PACEMAKER        Prior to Admission medications    Medication Sig Start Date End Date Taking? Authorizing Provider   phenytoin ER (DILANTIN ER) 100 mg ER capsule Take 200 mg by mouth daily. Phenytoin 200 mg AM and 100 mg PM   Yes Provider, Historical   phenytoin ER (DILANTIN ER) 100 mg ER capsule Take 100 mg by mouth nightly. Phenytoin 200 mg AM and 100 mg PM   Yes Provider, Historical   simvastatin (ZOCOR) 20 mg tablet Take 20 mg by mouth nightly. Yes Provider, Historical   OTHER Patient crushes medications. She opens phenytoin capsules and takes this with apple sauce. Yes Provider, Historical   aspirin 81 mg chewable tablet Take 81 mg by mouth daily. Yes Provider, Historical   latanoprost (XALATAN) 0.005 % ophthalmic solution Administer 1 Drop to both eyes nightly. Yes Provider, Historical   brimonidine (ALPHAGAN) 0.2 % ophthalmic solution Administer 1 Drop to both eyes three (3) times daily. Yes Provider, Historical   calcium carbonate-vitamin D3 600 mg(1,500mg) -800 unit tab Take 1 Tab by mouth every evening. Yes Provider, Historical   cyanocobalamin, vitamin B-12, 5,000 mcg TbDi Take 1 Tab by mouth daily. Yes Provider, Historical   cholecalciferol (VITAMIN D3) (5000 Units/125 mcg) tab tablet Take 5,000 Units by mouth daily. Yes Provider, Historical   senna-docusate (Senna-S) 8.6-50 mg per tablet Take 1 Tab by mouth every evening. Yes Provider, Historical   dorzolamide (TRUSOPT) 2 % ophthalmic solution Administer 1 Drop to both eyes two (2) times a day. Yes Provider, Historical   donepezil (ARICEPT) 10 mg tablet Take 10 mg by mouth nightly. Yes Provider, Historical     No Known Allergies   Social History     Tobacco Use    Smoking status: Never Smoker    Smokeless tobacco: Never Used   Substance Use Topics    Alcohol use: No      No family history on file.      Current Facility-Administered Medications   Medication Dose Route Frequency    sodium chloride (NS) flush 5-40 mL  5-40 mL IntraVENous Q8H    0.9% sodium chloride with KCl 20 mEq/L infusion   IntraVENous CONTINUOUS    cefepime (MAXIPIME) 2 g in sterile water (preservative free) 10 mL IV syringe  2 g IntraVENous Q12H    latanoprost (XALATAN) 0.005 % ophthalmic solution 1 Drop  1 Drop Both Eyes QHS    brimonidine (ALPHAGAN) 0.2 % ophthalmic solution 1 Drop  1 Drop Both Eyes TID    donepeziL (ARICEPT) tablet 10 mg  10 mg Oral QHS    dorzolamide (TRUSOPT) 2 % ophthalmic solution 1 Drop  1 Drop Both Eyes BID    famotidine (PEPCID) tablet 20 mg  20 mg Oral DAILY    [START ON 11/10/2020] vancomycin (VANCOCIN) 500 mg in 0.9% sodium chloride (MBP/ADV) 100 mL  500 mg IntraVENous Q24H    metroNIDAZOLE (FLAGYL) IVPB premix 500 mg  500 mg IntraVENous Q12H    phenytoin ER (DILANTIN ER) ER capsule 200 mg  200 mg Oral DAILY    phenytoin ER (DILANTIN ER) ER capsule 100 mg  100 mg Oral QHS       Review of Systems:  Review of systems not obtained due to patient factors. Objective:   Vital Signs:    Visit Vitals  /63 (BP 1 Location: Left arm, BP Patient Position: At rest;Supine)   Pulse 64   Temp 97.3 °F (36.3 °C)   Resp 17   Ht 5' 3\" (1.6 m)   Wt 44.9 kg (99 lb)   SpO2 94%   BMI 17.54 kg/m²       O2 Device: Room air       Temp (24hrs), Av.5 °F (36.4 °C), Min:97.3 °F (36.3 °C), Max:98.1 °F (36.7 °C)       Intake/Output:   Last shift:      No intake/output data recorded. Last 3 shifts: No intake/output data recorded.     Intake/Output Summary (Last 24 hours) at 2020 1844  Last data filed at 2020 1515  Gross per 24 hour   Intake 0 ml   Output    Net 0 ml      Physical Exam:   General:  cachectic   Head:  NCAT   Eyes:  Sclera anicteric   Nose:    Throat:    Neck: Trachea midline   Back:      Lungs:   Diminished breath sounds   Chest wall:     Heart:  RRR   Abdomen:   Soft, NT, +bs   Extremities: No edema   Pulses:    Skin:    Lymph nodes:    Neurologic: Not answering questions, h/o dementia     Data review:     Recent Results (from the past 24 hour(s))   CULTURE, BLOOD    Collection Time: 20  8:11 PM    Specimen: Blood   Result Value Ref Range    Special Requests: NO SPECIAL REQUESTS      Culture result: NO GROWTH AFTER 9 HOURS     EKG, 12 LEAD, INITIAL    Collection Time: 11/08/20 11:04 PM   Result Value Ref Range    Ventricular Rate 118 BPM    Atrial Rate 118 BPM    P-R Interval 116 ms    QRS Duration 78 ms    Q-T Interval 324 ms    QTC Calculation (Bezet) 454 ms    Calculated P Axis 92 degrees    Calculated R Axis -25 degrees    Calculated T Axis 87 degrees    Diagnosis       Sinus tachycardia with occasional premature ventricular complexes  Nonspecific T wave abnormality  Abnormal ECG  When compared with ECG of 09-MAR-2017 07:47,  Vent. rate has increased BY  45 BPM  Nonspecific T wave abnormality, worse in Lateral leads  Confirmed by Harshad Huang M.D., Shavonne Head ([id number]) on 11/9/2020 91:97:25 AM     METABOLIC PANEL, COMPREHENSIVE    Collection Time: 11/09/20  1:01 AM   Result Value Ref Range    Sodium 143 136 - 145 mmol/L    Potassium 3.8 3.5 - 5.1 mmol/L    Chloride 112 (H) 97 - 108 mmol/L    CO2 26 21 - 32 mmol/L    Anion gap 5 5 - 15 mmol/L    Glucose 102 (H) 65 - 100 mg/dL    BUN 26 (H) 6 - 20 MG/DL    Creatinine 0.81 0.55 - 1.02 MG/DL    BUN/Creatinine ratio 32 (H) 12 - 20      GFR est AA >60 >60 ml/min/1.73m2    GFR est non-AA >60 >60 ml/min/1.73m2    Calcium 8.9 8.5 - 10.1 MG/DL    Bilirubin, total 0.3 0.2 - 1.0 MG/DL    ALT (SGPT) 28 12 - 78 U/L    AST (SGOT) 47 (H) 15 - 37 U/L    Alk. phosphatase 83 45 - 117 U/L    Protein, total 8.1 6.4 - 8.2 g/dL    Albumin 1.7 (L) 3.5 - 5.0 g/dL    Globulin 6.4 (H) 2.0 - 4.0 g/dL    A-G Ratio 0.3 (L) 1.1 - 2.2     SAMPLES BEING HELD    Collection Time: 11/09/20  1:01 AM   Result Value Ref Range    SAMPLES BEING HELD 1SST,1RD,1BLU     COMMENT        Add-on orders for these samples will be processed based on acceptable specimen integrity and analyte stability, which may vary by analyte.    CULTURE, BLOOD    Collection Time: 11/09/20  1:01 AM    Specimen: Blood   Result Value Ref Range    Special Requests: NO SPECIAL REQUESTS      Culture result: NO GROWTH AFTER 5 HOURS LACTIC ACID    Collection Time: 11/09/20  1:01 AM   Result Value Ref Range    Lactic acid 1.1 0.4 - 2.0 MMOL/L   CULTURE, WOUND W GRAM STAIN    Collection Time: 11/09/20  1:01 AM    Specimen: Sacral Wound   Result Value Ref Range    Special Requests: NO SPECIAL REQUESTS      GRAM STAIN OCCASIONAL WBCS SEEN      GRAM STAIN 4+ GRAM NEGATIVE RODS      Culture result: PENDING    CBC WITH AUTOMATED DIFF    Collection Time: 11/09/20  1:01 AM   Result Value Ref Range    WBC 18.3 (H) 3.6 - 11.0 K/uL    RBC 2.59 (L) 3.80 - 5.20 M/uL    HGB 7.7 (L) 11.5 - 16.0 g/dL    HCT 24.7 (L) 35.0 - 47.0 %    MCV 95.4 80.0 - 99.0 FL    MCH 29.7 26.0 - 34.0 PG    MCHC 31.2 30.0 - 36.5 g/dL    RDW 17.3 (H) 11.5 - 14.5 %    PLATELET 864 107 - 734 K/uL    MPV 10.2 8.9 - 12.9 FL    NRBC 0.0 0  WBC    ABSOLUTE NRBC 0.00 0.00 - 0.01 K/uL    NEUTROPHILS 85 (H) 32 - 75 %    LYMPHOCYTES 7 (L) 12 - 49 %    MONOCYTES 7 5 - 13 %    EOSINOPHILS 0 0 - 7 %    BASOPHILS 0 0 - 1 %    IMMATURE GRANULOCYTES 1 (H) 0.0 - 0.5 %    ABS. NEUTROPHILS 15.5 (H) 1.8 - 8.0 K/UL    ABS. LYMPHOCYTES 1.3 0.8 - 3.5 K/UL    ABS. MONOCYTES 1.3 (H) 0.0 - 1.0 K/UL    ABS. EOSINOPHILS 0.0 0.0 - 0.4 K/UL    ABS. BASOPHILS 0.1 0.0 - 0.1 K/UL    ABS. IMM. GRANS. 0.1 (H) 0.00 - 0.04 K/UL    DF AUTOMATED     METABOLIC PANEL, COMPREHENSIVE    Collection Time: 11/09/20  1:01 AM   Result Value Ref Range    Sodium 145 136 - 145 mmol/L    Potassium 3.7 3.5 - 5.1 mmol/L    Chloride 118 (H) 97 - 108 mmol/L    CO2 22 21 - 32 mmol/L    Anion gap 5 5 - 15 mmol/L    Glucose 102 (H) 65 - 100 mg/dL    BUN 25 (H) 6 - 20 MG/DL    Creatinine 0.72 0.55 - 1.02 MG/DL    BUN/Creatinine ratio 35 (H) 12 - 20      GFR est AA >60 >60 ml/min/1.73m2    GFR est non-AA >60 >60 ml/min/1.73m2    Calcium 8.3 (L) 8.5 - 10.1 MG/DL    Bilirubin, total 0.3 0.2 - 1.0 MG/DL    ALT (SGPT) 23 12 - 78 U/L    AST (SGOT) 40 (H) 15 - 37 U/L    Alk.  phosphatase 76 45 - 117 U/L    Protein, total 7.4 6.4 - 8.2 g/dL Albumin 1.5 (L) 3.5 - 5.0 g/dL    Globulin 5.9 (H) 2.0 - 4.0 g/dL    A-G Ratio 0.3 (L) 1.1 - 2.2     PROCALCITONIN    Collection Time: 11/09/20  1:01 AM   Result Value Ref Range    Procalcitonin 0.15 ng/mL       Imaging:  I have personally reviewed the patients radiographs and have reviewed the reports:          Therese Tan MD

## 2020-11-09 NOTE — PROGRESS NOTES
Problem: Pressure Injury - Risk of  Goal: *Prevention of pressure injury  Description: Document Carlos Scale and appropriate interventions in the flowsheet.   Outcome: Progressing Towards Goal  Note: Pressure Injury Interventions:  Sensory Interventions: Assess changes in LOC, Assess need for specialty bed, Discuss PT/OT consult with provider    Moisture Interventions: Absorbent underpads    Activity Interventions: PT/OT evaluation    Mobility Interventions: HOB 30 degrees or less    Nutrition Interventions: Document food/fluid/supplement intake    Friction and Shear Interventions: HOB 30 degrees or less                Problem: Patient Education: Go to Patient Education Activity  Goal: Patient/Family Education  Outcome: Progressing Towards Goal

## 2020-11-09 NOTE — PROGRESS NOTES
Pharmacy changed Vancomycin to 1000 mg IV and Cefepime to 2 grams IV, per protocol. Will follow for further orders.

## 2020-11-09 NOTE — PROGRESS NOTES
OCCUPATIONAL THERAPY EVALUATION/DISCHARGE  Patient: Radha Lyon (55 y.o. female)  Date: 11/9/2020  Primary Diagnosis: Sepsis ([facility] [address] [A41.9]  Decubitus ulcer of sacral region, stage 4 ([facility] [address] [L89.154]  Cellulitis of multiple sites of buttock [L03.317]  Dehydration [E86.0]  Failure to thrive in adult [R62.7]  Leukocytosis [D72.829]       Precautions:  Skin, DNR, Fall    ASSESSMENT  Based on the objective data described below, the patient presents with confusion, poor sitting balance, decreased initiation of activity, poor command following, global weakness (L weaker than R), L inattention, and presence of LE contractures impacting ADL performance and mobility following admission for potential infection of multiple malodorous wounds/ulcers. Pt with h/o dementia and R CVA with residual L side deficits. Pt able to occasionally answer yes/no to questions and says 1-2 words in response to questions, but nothing consistent. She requires max to total A x 2 for all bed mobility and requires near constant support at EOB due to lateral leaning. Pt is unable to track visual stimulus to L beyond midline and requires physical A to rotate head/neck to L. She is able to hold a cup with hand-over-hand assist to take a drink and requires increased time for all tasks. At this time, pt is not likely to benefit from skilled services and anticipate pt was requiring total care at baseline. Pt would most benefit from turn team for turning and positioning with pillows to prevent further skin breakdown. Pt will require 24/7 care. If family unable to provide level of assistance, pt may benefit from LTC placement. Current Level of Function (ADLs/self-care): total care/dependent    Functional Outcome Measure: The patient scored 0/0 on the Barthel outcome measure which is indicative of total functional impairment.       Other factors to consider for discharge: based on presence of contractures and ulcers, anticipate pt was bed-bound at home; baseline dementia and h/o CVA. PLAN :  Recommend with staff: turn team for frequent turning for pressure relief; float heels; pillow between knees to prevent skin breakdown; PROM to prevent further contractures; Assistance for all ADLs, including meals    Recommendation for discharge: (in order for the patient to meet his/her long term goals)  No skilled occupational therapy/ follow up rehabilitation needs identified at this time. Pt would most benefit from LTC placement. This discharge recommendation:  Has not yet been discussed the attending provider and/or case management    IF patient discharges home will need the following DME: pt owns needed DME per chart (hospital bed)       SUBJECTIVE:   Patient stated yes.     OBJECTIVE DATA SUMMARY:   HISTORY:   Past Medical History:   Diagnosis Date    Dizziness     Essential hypertension     Glaucoma     Hx of completed stroke     NSVT (nonsustained ventricular tachycardia) ([facility] [address] 7/10/2012    Pacemaker     Seizure disorder ([facility] [address]     Sick sinus syndrome (HCC)      Past Surgical History:   Procedure Laterality Date    HX PACEMAKER         Prior Level of Function/Environment/Context: unclear, however anticipate pt was bed bound and requiring assistance for all ADLs  Expanded or extensive additional review of patient history:   Home Situation  Home Environment: Private residence  One/Two Story Residence: One story  Living Alone: No  Support Systems: Child(marco antonio), Family member(s)  Patient Expects to be Discharged to[de-identified] Private residence  Current DME Used/Available at Home: [facility], bedside, Hospital bed, Wheelchair, [employer], Shower chair    Hand dominance: Right    EXAMINATION OF PERFORMANCE DEFICITS:  Cognitive/Behavioral Status:  Neurologic State: Alert  Orientation Level: Oriented to person(pt with minimal responses to questions)  Cognition: Decreased attention/concentration;Decreased command following  Perception: Cues to attend left visual field;Cues to attend to left side of body;Cues to maintain midline in sitting; Tactile;Verbal;Visual  Perseveration: No perseveration noted  Safety/Judgement: Decreased awareness of environment;Lack of insight into deficits    Hearing: Auditory  Auditory Impairment: None    Vision/Perceptual:    Tracking: Able to track right of midline; Unable to track left to  midline(difficult to fully assess due to decreased visual attention)    Saccades: Unable to test secondary to decreased visual attention      Range of Motion:  AROM: Grossly decreased, non-functional(bilateral knee flexion contractures; right slightly less than left)    Strength:  Strength: Grossly decreased, non-functional    Coordination:  Coordination: Grossly decreased, non-functional(essential tremor in R UE)  Fine Motor Skills-Upper: Left Impaired;Right Impaired    Gross Motor Skills-Upper: Left Impaired;Right Impaired    Tone:  Tone: Abnormal    Balance:  Sitting: Impaired  Sitting - Static: Supported sitting(leans to right)  Sitting - Dynamic: Supported sitting  Standing: (not attempted)    Functional Mobility and Transfers for ADLs:  Bed Mobility:  Rolling: Total assistance;Assist x2  Supine to Sit: Assist x2;Total assistance  Sit to Supine: Assist x2;Total assistance  Scooting: Assist x2;Total assistance    Transfers:  Sit to Stand: (not assess 2/2 sitting balance, contractures, command follow)    ADL Assessment:  Feeding: Maximum assistance; Total assistance    Oral Facial Hygiene/Grooming: Maximum assistance; Total assistance    Bathing: Total assistance    Upper Body Dressing: Maximum assistance; Total assistance    Lower Body Dressing: Total assistance    Toileting: Total assistance    ADL Intervention and task modifications:  Feeding  Drink to Mouth: Maximum assistance; Total assistance (dependent)(pt able to hold cup with R hand with assist; hand over hand)  Cues: Physical assistance; Tactile cues provided;Verbal cues provided;Visual cues provided    Lower Body Dressing Assistance  Socks: Total assistance (dependent)  Leg Crossed Method Used: No  Position Performed: Supine  Cues: Don;Physical assistance    Cognitive Retraining  Safety/Judgement: Decreased awareness of environment;Lack of insight into deficits    Functional Measure:  Barthel Index:    Bathin  Bladder: 0  Bowels: 0  Groomin  Dressin  Feedin  Mobility: 0  Stairs: 0  Toilet Use: 0  Transfer (Bed to Chair and Back): 0  Total: 0/100        The Barthel ADL Index: Guidelines  1. The index should be used as a record of what a patient does, not as a record of what a patient could do. 2. The main aim is to establish degree of independence from any help, physical or verbal, however minor and for whatever reason. 3. The need for supervision renders the patient not independent. 4. A patient's performance should be established using the best available evidence. Asking the patient, friends/relatives and nurses are the usual sources, but direct observation and common sense are also important. However direct testing is not needed. 5. Usually the patient's performance over the preceding 24-48 hours is important, but occasionally longer periods will be relevant. 6. Middle categories imply that the patient supplies over 50 per cent of the effort. 7. Use of aids to be independent is allowed. Chantelle Vergara., Barthel, DMarkW. (0829). Functional evaluation: the Barthel Index. [address] [facility] (14)2. TIARA Mitchell, Apoorva Edwards., Jerry Reyna.[facility], [address] [place] [address] (). Measuring the change indisability after inpatient rehabilitation; comparison of the responsiveness of the Barthel Index and Functional Gilchrist Measure. Journal of Neurology, Neurosurgery, and Psychiatry, 66(4), 075-251. Scooter Gómez, N.DAVID.A, KADEN Hayden, & DARCIE ManeA. (2004.) Assessment of post-stroke quality of life in cost-effectiveness studies: The usefulness of the Barthel Index and the EuroQoL-5D.  Quality of Life Research, 15, 880-25     Occupational Therapy Evaluation Charge Determination   History Examination Decision-Making   MEDIUM Complexity : Expanded review of history including physical, cognitive and psychosocial  history  HIGH Complexity : 5 or more performance deficits relating to physical, cognitive , or psychosocial skils that result in activity limitations and / or participation restrictions HIGH Complexity : Patient presents with comorbidities that affect occupational performance. Signifigant modification of tasks or assistance (eg, physical or verbal) with assessment (s) is necessary to enable patient to complete evaluation       Based on the above components, the patient evaluation is determined to be of the following complexity level: MEDIUM  Pain Rating:  Pt with minimal verbalizations; shook head \"no\" at one point when asked if she was uncomfortable once repositioned in bed. Activity Tolerance:   Poor    After treatment patient left in no apparent distress:    Patient positioned in Right sidelying for pressure relief, Call bell within reach, Bed / chair alarm activated and Side rails x 3    COMMUNICATION/EDUCATION:   The patients plan of care was discussed with: Physical therapist and Registered nurse.      Thank you for this referral.  Eryn Reddy OT  Time Calculation: 27 mins

## 2020-11-09 NOTE — WOUND CARE
Initial wound consult: 
Assess sacral ,trochanter wounds and buttock wound. ASSESSMENT: 
 patient awake but nonverbal. Does not react to conversation or turning. Some discomfort noted when changing dressings. Non mobile. Does not assist with turning Pure Wick in place [facility] Nutritional consult Bilateral heel intact with boots on. Wounds buttocks #1and sacral#2 Right  # 3 trochanter #4 letf middle lateral back wound . 1. POA left buttocks=4x6x.3cm   Wound bed slough and eschar. Drainage serous . Moderate amount Carmen wound reddened 2. POA Sacral stage 4 pressure injury bone present , slough and eschar foul smelling  8x4x2  copious amount of serous sanguinous  Carmen wound- reddened. 3. POA left Trochanter with eschar and slough , foul smelling. Moderate serous sanguinous drainage =5.5x2x.3cm   Carmen wound intact. 4. POA Left middle lateral back wound slough  5.5X4X0.6cm  Tunneling at 12:00 -0.9cm moderate serous sanguinous foul odor. Recommendations: 
Wet to dry with NSS and 4x4's cover with ABD and tape in place daily , more often if needed for inconvenience or excess drainage Turn reposition approximately every 2 hours and offload heels with pillows or use heels bootsat all times in bed. Minimize layers of linen/-pads under patient to optimize support surface. Discussed with surgery Tu Mendze RN 
 Dressing changed turned from left side to back

## 2020-11-09 NOTE — ED NOTES
TRANSFER - OUT REPORT:    Verbal report given to Noelle(name) on Lucy Pierre  being transferred to 508(unit) for routine progression of care       Report consisted of patients Situation, Background, Assessment and   Recommendations(SBAR). Information from the following report(s) SBAR, Kardex, ED Summary, MAR, Recent Results and Cardiac Rhythm sinus tach was reviewed with the receiving nurse. Lines:   Peripheral IV 11/09/20 Left External jugular (Active)        Opportunity for questions and clarification was provided.       Patient transported with:   Monitor  Tech

## 2020-11-09 NOTE — CONSULTS
Cross Coverage Infectious Disease Consult Note    Reason for Consult: Sacral ulcer with concern for osteomyelitis  Date of Consultation: November 9, 2020  Date of Admission: 11/8/2020  Referring Physician: Dr Guerrier Eye       HPI:    Unable to obtain any history from the patient    Chart reviewed and I spoke to patient's granddaughter by phone today    Ms Jacqueline Abdi is a 44-year-old lady with a history of seizures, stroke status post cardiac device/pacemaker, sick sinus syndrome who was brought to the hospital for concerns for infected sacral decub ulcer/osteomyelitis. Per discussion with the granddaughter, patient has had chronic wounds at least for more than a month. Her granddaughter is not really clear about details but says that there has been a sharp decline in the last month. The wounds were initially small to begin with and she was doing local wound care and packing but has noticed recently that they had foul odor from the wounds. And she was concerned for infection and therefore brought her grandmother to the hospital.  Patient's granddaughter tells me that her grandmother communicates with few words with her but did not want to come to the hospital.  Patient's granddaughter tells me that she felt really bad sending her to the hospital but wanted to do so to assess for an infection. Patient herself does not speak or was verbal when I was in the room with another nurse. It is hard to assess for pain or side effects as the patient is nonverbal at this time. From chart review, she is currently afebrile with a leukocytosis of 40.1, neutrophilic predominant today. Labs also show anemia. Renal function and LFTs are stable. Cultures and wound cultures have been sent and pending. She has been started on vancomycin cefepime and Flagyl.         Past Medical History:  Past Medical History:   Diagnosis Date    Dizziness     Essential hypertension     Glaucoma     Hx of completed stroke     NSVT (nonsustained ventricular tachycardia) ([facility] [address] 7/10/2012    Pacemaker     Seizure disorder (HCC)     Sick sinus syndrome [facility])          Surgical History:  Past Surgical History:   Procedure Laterality Date    HX PACEMAKER           Family History:   No family history on file. Social History:     · Living Situation: From home  · Unable to obtain rest of social history as patient is nonverbal    Allergies:  No Known Allergies      Review of Systems:    Unable to obtain review of systems from patient is nonverbal    Medications:  No current facility-administered medications on file prior to encounter. Current Outpatient Medications on File Prior to Encounter   Medication Sig Dispense Refill    phenytoin ER (DILANTIN ER) 100 mg ER capsule Take 200 mg by mouth daily. Phenytoin 200 mg AM and 100 mg PM      phenytoin ER (DILANTIN ER) 100 mg ER capsule Take 100 mg by mouth nightly. Phenytoin 200 mg AM and 100 mg PM      simvastatin (ZOCOR) 20 mg tablet Take 20 mg by mouth nightly.  OTHER Patient crushes medications. She opens phenytoin capsules and takes this with apple sauce.  aspirin 81 mg chewable tablet Take 81 mg by mouth daily.  latanoprost (XALATAN) 0.005 % ophthalmic solution Administer 1 Drop to both eyes nightly.  brimonidine (ALPHAGAN) 0.2 % ophthalmic solution Administer 1 Drop to both eyes three (3) times daily.  calcium carbonate-vitamin D3 600 mg(1,500mg) -800 unit tab Take 1 Tab by mouth every evening.  cyanocobalamin, vitamin B-12, 5,000 mcg TbDi Take 1 Tab by mouth daily.  cholecalciferol (VITAMIN D3) (5000 Units/125 mcg) tab tablet Take 5,000 Units by mouth daily.  senna-docusate (Senna-S) 8.6-50 mg per tablet Take 1 Tab by mouth every evening.  dorzolamide (TRUSOPT) 2 % ophthalmic solution Administer 1 Drop to both eyes two (2) times a day.  donepezil (ARICEPT) 10 mg tablet Take 10 mg by mouth nightly.              Physical Exam:    Vitals:   Patient Vitals for the past 24 hrs:   Temp Pulse Resp BP SpO2   11/09/20 1226 97.5 °F (36.4 °C) 97 16 124/84 94 %   11/09/20 0723 97.3 °F (36.3 °C) 60 17 129/69 95 %   11/09/20 0705  100      11/09/20 0527 98.1 °F (36.7 °C) (!) 111 18 129/75 93 %   11/09/20 0500  (!) 108 18 (!) 143/77    11/09/20 0445  (!) 111 18 (!) 150/57    11/09/20 0430  (!) 110 16 128/71    11/09/20 0415  (!) 103 16 129/79    11/09/20 0400  (!) 112 17 132/84    11/09/20 0345  (!) 114 18 (!) 140/59 93 %   11/09/20 0330  (!) 110 16 138/75    11/09/20 0300  (!) 112 17 124/79    11/09/20 0245  (!) 116 18 128/68    11/09/20 0230  (!) 117 21 133/66    11/09/20 0200  (!) 115 24 138/71    11/09/20 0115  (!) 108 16 (!) 141/62    11/09/20 0100  (!) 113 19 (!) 138/52    11/09/20 0045  (!) 113 21 139/74    11/09/20 0030    129/84    11/09/20 0015    131/82    11/09/20 0000  (!) 117 17 (!) 141/84    11/08/20 2345  (!) 116 19 137/81    11/08/20 2330  (!) 117 22 (!) 140/86    11/08/20 2315  (!) 120 21 137/75    11/08/20 1938 97.5 °F (36.4 °C) (!) 124 16 (!) 162/95 99 %   ·   · GEN: NAD, cachectic  · HEENT:  no scleral icterus, unable to assess oral cavity  · CV: S1, S2 heard regularly and tachycardic  · Lungs: Clear to auscultation bilaterally anteriorly  · Abdomen: soft, non distended, no facial grimace to palpation  · MSK: Sacral ulcers noted, multiple with clean appearing wounds and no discharge or surrounding erythema, 1 ulcer probes to bone  · Extremities: no edema, contracted extremities  · Neuro: Awake , does not follow commands, nonverbal   · skin: no rash        Labs:   Recent Results (from the past 24 hour(s))   CULTURE, BLOOD    Collection Time: 11/08/20  8:11 PM    Specimen: Blood   Result Value Ref Range    Special Requests: NO SPECIAL REQUESTS      Culture result: NO GROWTH AFTER 9 HOURS     EKG, 12 LEAD, INITIAL    Collection Time: 11/08/20 11:04 PM   Result Value Ref Range Ventricular Rate 118 BPM    Atrial Rate 118 BPM    P-R Interval 116 ms    QRS Duration 78 ms    Q-T Interval 324 ms    QTC Calculation (Bezet) 454 ms    Calculated P Axis 92 degrees    Calculated R Axis -25 degrees    Calculated T Axis 87 degrees    Diagnosis       Sinus tachycardia with occasional premature ventricular complexes  Nonspecific T wave abnormality  Abnormal ECG  When compared with ECG of 09-MAR-2017 07:47,  Vent. rate has increased BY  45 BPM  Nonspecific T wave abnormality, worse in Lateral leads  Confirmed by Martha Hair M.D., [facility] ([id number]) on 11/9/2020 27:30:73 AM     METABOLIC PANEL, COMPREHENSIVE    Collection Time: 11/09/20  1:01 AM   Result Value Ref Range    Sodium 143 136 - 145 mmol/L    Potassium 3.8 3.5 - 5.1 mmol/L    Chloride 112 (H) 97 - 108 mmol/L    CO2 26 21 - 32 mmol/L    Anion gap 5 5 - 15 mmol/L    Glucose 102 (H) 65 - 100 mg/dL    BUN 26 (H) 6 - 20 MG/DL    Creatinine 0.81 0.55 - 1.02 MG/DL    BUN/Creatinine ratio 32 (H) 12 - 20      GFR est AA >60 >60 ml/min/1.73m2    GFR est non-AA >60 >60 ml/min/1.73m2    Calcium 8.9 8.5 - 10.1 MG/DL    Bilirubin, total 0.3 0.2 - 1.0 MG/DL    ALT (SGPT) 28 12 - 78 U/L    AST (SGOT) 47 (H) 15 - 37 U/L    Alk. phosphatase 83 45 - 117 U/L    Protein, total 8.1 6.4 - 8.2 g/dL    Albumin 1.7 (L) 3.5 - 5.0 g/dL    Globulin 6.4 (H) 2.0 - 4.0 g/dL    A-G Ratio 0.3 (L) 1.1 - 2.2     SAMPLES BEING HELD    Collection Time: 11/09/20  1:01 AM   Result Value Ref Range    SAMPLES BEING HELD 1SST,1RD,1BLU     COMMENT        Add-on orders for these samples will be processed based on acceptable specimen integrity and analyte stability, which may vary by analyte.    CULTURE, BLOOD    Collection Time: 11/09/20  1:01 AM    Specimen: Blood   Result Value Ref Range    Special Requests: NO SPECIAL REQUESTS      Culture result: NO GROWTH AFTER 5 HOURS     LACTIC ACID    Collection Time: 11/09/20  1:01 AM   Result Value Ref Range    Lactic acid 1.1 0.4 - 2.0 MMOL/L CBC WITH AUTOMATED DIFF    Collection Time: 11/09/20  1:01 AM   Result Value Ref Range    WBC 18.3 (H) 3.6 - 11.0 K/uL    RBC 2.59 (L) 3.80 - 5.20 M/uL    HGB 7.7 (L) 11.5 - 16.0 g/dL    HCT 24.7 (L) 35.0 - 47.0 %    MCV 95.4 80.0 - 99.0 FL    MCH 29.7 26.0 - 34.0 PG    MCHC 31.2 30.0 - 36.5 g/dL    RDW 17.3 (H) 11.5 - 14.5 %    PLATELET 310 079 - 130 K/uL    MPV 10.2 8.9 - 12.9 FL    NRBC 0.0 0  WBC    ABSOLUTE NRBC 0.00 0.00 - 0.01 K/uL    NEUTROPHILS 85 (H) 32 - 75 %    LYMPHOCYTES 7 (L) 12 - 49 %    MONOCYTES 7 5 - 13 %    EOSINOPHILS 0 0 - 7 %    BASOPHILS 0 0 - 1 %    IMMATURE GRANULOCYTES 1 (H) 0.0 - 0.5 %    ABS. NEUTROPHILS 15.5 (H) 1.8 - 8.0 K/UL    ABS. LYMPHOCYTES 1.3 0.8 - 3.5 K/UL    ABS. MONOCYTES 1.3 (H) 0.0 - 1.0 K/UL    ABS. EOSINOPHILS 0.0 0.0 - 0.4 K/UL    ABS. BASOPHILS 0.1 0.0 - 0.1 K/UL    ABS. IMM. GRANS. 0.1 (H) 0.00 - 0.04 K/UL    DF AUTOMATED     METABOLIC PANEL, COMPREHENSIVE    Collection Time: 11/09/20  1:01 AM   Result Value Ref Range    Sodium 145 136 - 145 mmol/L    Potassium 3.7 3.5 - 5.1 mmol/L    Chloride 118 (H) 97 - 108 mmol/L    CO2 22 21 - 32 mmol/L    Anion gap 5 5 - 15 mmol/L    Glucose 102 (H) 65 - 100 mg/dL    BUN 25 (H) 6 - 20 MG/DL    Creatinine 0.72 0.55 - 1.02 MG/DL    BUN/Creatinine ratio 35 (H) 12 - 20      GFR est AA >60 >60 ml/min/1.73m2    GFR est non-AA >60 >60 ml/min/1.73m2    Calcium 8.3 (L) 8.5 - 10.1 MG/DL    Bilirubin, total 0.3 0.2 - 1.0 MG/DL    ALT (SGPT) 23 12 - 78 U/L    AST (SGOT) 40 (H) 15 - 37 U/L    Alk. phosphatase 76 45 - 117 U/L    Protein, total 7.4 6.4 - 8.2 g/dL    Albumin 1.5 (L) 3.5 - 5.0 g/dL    Globulin 5.9 (H) 2.0 - 4.0 g/dL    A-G Ratio 0.3 (L) 1.1 - 2.2     PROCALCITONIN    Collection Time: 11/09/20  1:01 AM   Result Value Ref Range    Procalcitonin 0.15 ng/mL       Microbiology Data:       Blood: 11/8 and 11/9/20 pending       Imaging:   Ct A/P  FINDINGS:   LOWER THORAX: Moderate to large left pleural effusion. Cardiomegaly. Cardiac  pacer. Left basilar atelectasis. LIVER/GALLBLADDER: Limited evaluation Gallbladder is within normal limits. Scatter artifact limits evaluation  SPLEEN/PANCREAS:  within normal limits. ADRENALS/KIDNEYS: Unremarkable. No calculus or hydronephrosis. STOMACH: Unremarkable. SMALL BOWEL/COLON: Fecal stasis. Extensive. APPENDIX: Not visualized  PERITONEUM: No ascites or pneumoperitoneum. RETROPERITONEUM: Aortic atherosclerotic change. No aneurysm. REPRODUCTIVE ORGANS: Unremarkable. URINARY BLADDER: No mass or calculus. BONES: Multiple compression deformities moderate to severe T11 T12 L1. Age-indeterminate. Multilevel severe foraminal stenoses of the lumbar spine. Sacral decubitus ulcer is noted. Soft tissue defect extensive posterior aspect  of the pubic ramus on the left. Osteopenia. Chronic pubic rami deformities on  the right and on the left. ADDITIONAL COMMENTS: N/A     IMPRESSION  IMPRESSION:  Sacral decubitus remote wound soft tissue defect that extends to the margin of  the inferior pubic ramus on the left.        Cardiomegaly with moderate left pleural effusion and left basilar atelectasis. Assessment / Plan:     Ms Rosio Bradshaw is a 63-year-old lady with a history of seizures, stroke status post cardiac device/pacemaker, sick sinus syndrome who was brought to the hospital for concerns for infected sacral decub ulcer/osteomyelitis.         1) sacral decubitus ulcer, likely osteomyelitis with exposed bone  I discussed in detail with patient's granddaughter today  Antibiotics alone without good nutrition, offloading pressure and wound care usually not effective and antibiotics usually not curative in such type situations  I am  concerned about antibiotic associated adverse effects given her age and inability to to express her symptoms or side effects if she experiences any, risk for C. difficile and lowering seizure threshold with broad antibiotics over long course  Noted surgery has been consulted  Discussed with granddaughter, that in such difficult scenarios, we could treat her as and  when she has superimposed infection for short course of antibiotics and then monitor versus long-term 6 weeks of IV antibiotics that would likely not be curative in her situation. Her grand daughter favors just treating intermittently rather than long-term antibiotic courses  Awaiting wound cultures to narrow antibiotics. Wound cultures could also represent colonization. Which was discussed  Of most importance is good wound care and offloading pressure  High risk for recurrent infections with risk for fecal /urine contamination  Given the overall state in which Ms. Shirl Barthel is, would recommend goals of care conversation with the primary team  For now on broad-spectrum vancomycin cefepime and Flagyl pending cultures  Monitor renal function closely  Antibiotic side effects/adverse effects/toxicities discussed including gastrointestinal, renal, hematological , ability to lower siezure threshold, risk for C diff infection. Probiotics, daily yogurt encouraged. 2) history of seizures    3) History of stroke     4) Hx sick sinus syndrome status post pacer  Await blood cultures  If blood cultures positive, will need further work-up based on goals of care    5) leukocytosis continue to monitor    6) anemia     7) DVT prophylaxis      Discussed plan of care with patient's granddaughter by phone today      Thank for the opportunity to participate in the care of this patient. Please contact with questions or concerns.        Estiven Butler,   4:22 PM

## 2020-11-09 NOTE — PROGRESS NOTES
[address] spoke with [place] gave all locations and measurements of wounds. Patient has a pharyngeal swallow, but oral phase is severely delayed. NOT functional for meds and not efficient for PO. Feeding her by mouth will most likely take hours, even if she would accept it due to severity of oral holding. Ok for sips for comfort when alert and upright     will look into case     #  [phone]     16:00 Received call from [facility] asking for pictures of wounds to build case.  Forensic was called to take these pictures which they will do tomorrow Debra Quintanilla ( aware )  Also Madyson Burns informed of all correspondence

## 2020-11-09 NOTE — FORENSIC NURSE
FNE made aware of patient. FNE advised forensic evaluation will be at later time, likely tomorrow (11/10). Please notify FNE with further concerns or questions.

## 2020-11-09 NOTE — PROGRESS NOTES
Problem: Dysphagia (Adult)  Goal: *Acute Goals and Plan of Care (Insert Text)  Description: Swallowing goals initiated 11-9-20:  1) tolerate sips and purees in small amounts without s/s aspiration   Outcome: Progressing Towards Goal   SPEECH LANGUAGE PATHOLOGY BEDSIDE SWALLOW EVALUATION  Patient: Costa Humphries (68 y.o. female)  Date: 11/9/2020  Primary Diagnosis: Sepsis ([address] [A41.9]  Decubitus ulcer of sacral region, stage 4 (HCC) [L89.154]  Cellulitis of multiple sites of buttock [L03.317]  Dehydration [E86.0]  Failure to thrive in adult [R62.7]  Leukocytosis [D72.829]        Precautions: aspiration  Skin, DNR, Fall    ASSESSMENT :  Based on the objective data described below, the patient presents with moderate to severe oral holding, preventative of sufficient nutrition intake. She will take max cues and time to eat, drink safely. She is not functional for oral meds due to severity of oral holding. Aspiration risk due to feeder status, dependence for oral care, oral dysphagia. Admitted 11-8-20  with decub and buttocks decubiti. Also Large L pleural effusion. PMh:  CVAs with large area of encephalomalacia with evidence of prior craniotomy. HTN< sz, SSS with pacer,  HTN, glaucoma. Patient will benefit from skilled intervention to address the above impairments. Patients rehabilitation potential is considered to be Guarded     PLAN :  Recommendations and Planned Interventions:  Ok for sips of thins and bites of purees when awake. No meds by mouth. SLP will follow up. Needs goals of care discussion for comfort feeds vs alternative feeding. Frequency/Duration: Patient will be followed by speech-language pathology 3 times a week to address goals. Discharge Recommendations: To Be Determined     SUBJECTIVE:   Patient agreeable to PO.     OBJECTIVE:     Past Medical History:   Diagnosis Date    Dizziness     Essential hypertension     Glaucoma     Hx of completed stroke     [facility]    Pacemaker     Seizure disorder ([facility]     Sick sinus syndrome (HCC)      Past Surgical History:   Procedure Laterality Date    HX PACEMAKER       Prior Level of Function/Home Situation:   Home Situation  Home Environment: Private residence  One/Two Story Residence: One story  Living Alone: No  Support Systems: Child(marco antonio), Family member(s)  Patient Expects to be Discharged to[de-identified] Private residence  Current DME Used/Available at Home: Commode, bedside, Hospital bed, Wheelchair, Walker, Shower chair  Diet prior to admission: ?  Current Diet:  NPO   Cognitive and Communication Status:  Neurologic State: Alert  Orientation Level: Unable to verbalize  Cognition: Follows commands  Perception: Appears intact  Perseveration: No perseveration noted  Safety/Judgement: Lack of insight into deficits  Oral Assessment:  Oral Assessment  Labial: (lower lip protruding)  Dentition: Edentulous(? difficult to assess due to reduced oral opening)  Oral Hygiene: WFL  Lingual: No impairment  Velum: No impairment  Mandible: No impairment  P.O. Trials:  Patient Position: upright in bed, upright sitting on side of bed with OT/PT  Vocal quality prior to P.O.: (weak, low volume, minimally verbal)  Consistency Presented: Thin liquid;Puree  How Presented: SLP-fed/presented;Spoon;Straw;Successive swallows   ORAL PHASE:   Bolus Acceptance: Impaired(reduced oral opening. needed spoon bumps on lower lip)  Bolus Formation/Control: Impaired(severe oral holding with purees. less with thins. difficultt to r/o oral holding vs delay in swallow)  Type of Impairment: Anterior;Posterior  Propulsion: Delayed (# of seconds); Discoordination  Oral Residue: (difficult to assess)  PHARYNGEAL PHASE:   Initiation of Swallow: (difficult to assess vs oral holding)  Laryngeal Elevation: Functional  Aspiration Signs/Symptoms: None(risk for silent aspiration due to feeder status)                        NOMS:   The NOMS functional outcome measure was used to quantify this patient's level of swallowing impairment. Based on the NOMS, the patient was determined to be at level 3 for swallow function       NOMS Swallowing Levels:  Level 1 (CN): NPO  Level 2 (CM): NPO but takes consistency in therapy  Level 3 (CL): Takes less than 50% of nutrition p.o. and continues with nonoral feedings; and/or safe with mod cues; and/or max diet restriction  Level 4 (CK): Safe swallow but needs mod cues; and/or mod diet restriction; and/or still requires some nonoral feeding/supplements  Level 5 (CJ): Safe swallow with min diet restriction; and/or needs min cues  Level 6 (CI): Independent with p.o.; rare cues; usually self cues; may need to avoid some foods or needs extra time  Level 7 ([address] [place] [address]): Independent for all p.o.  LUDIN. (2003). National Outcomes Measurement System (NOMS): Adult Speech-Language Pathology User's Guide. Pain:             After treatment:   Patient left in no apparent distress in bed and Nursing notified    COMMUNICATION/EDUCATION:   Patient was educated regarding her deficit(s) of DYSPHAGIA  as this relates to her diagnosis of DECubiti ulcers. She demonstrated Guarded understanding as evidenced by lack of insight into deficits. .    The patient's plan of care including recommendations, planned interventions, and recommended diet changes were discussed with: Physical therapist, Occupational therapist, and Registered nurse. RD    Patient is unable to participate in goal setting and plan of care.     Thank you for this referral.  Finesse Perkins SLP  Time Calculation: 20 mins

## 2020-11-09 NOTE — PROGRESS NOTES
[facility]    Medical Progress Note      NAME: Mark Fair   :  1926  MRM:  [id number]    Date/Time of service 2020  9:25 AM          Assessment and Plan:     Decubitus ulcer of sacral region, stage 4 / Cellulitis of multiple sites of buttock - POA, due to immobility and malnutrition. Wound, ID and surgery consult. Follow cx. Unlikely to heal.  For now cefepime, vanco and flagyl. Palliative consult for hospice. Sepsis / Leukocytosis / Sinus tachycardia - POA due to ulcer. Monitor cx. Abx as above    Acute metabolic encephalopathy / Hx of completed stroke / Dementia - POA, unclear baseline, but severe dementia on interview. She does not have capacity. In setting of other issues, hospice will be the only reasonable discharge option. Granddaughter Laly Huffman agrees and is willing to speak with hospice about DC to Home [facility]. She will no longer benefit from ASA and simvastatin for CVA protection. Continue donepezil and phenytoin, check level. Sick sinus syndrome / PAT (paroxysmal atrial tachycardia) / Pacemaker - POA, stable. Not on rate control and not on anticoagulation. Severe protein-calorie malnutrition / Failure to thrive in adult - POA, likely poor PO intake due to dementia, exacerbated by sepsis. Anemia - POA, due to chronic disease and sepsis, and will worsen with hydration. Monitor and transfuse as needed. Check serologies. Pleural effusion / Pulmonary hypertension - POA, effusion worsening per radiology report. Unclear etiology. Cannot diurese in setting of dehydration. Pulmonary consult. Glaucoma - Continue trusopt, and brimonidine drops    GERD - Pepcid    Essential hypertension, benign - Hold norvasc and lisinopril    Dehydration - hydrate and follow.          Chief Complaint:  Cannot obtain due to dementia    ROS:  (bold if positive, if negative)    Not Tolerating PT  Not Tolerating Diet        Objective:     Last 24hrs VS reviewed since prior progress note.  Most recent are:    Visit Vitals  /69 (BP 1 Location: Right arm, BP Patient Position: At rest;Supine)   Pulse 60   Temp 97.3 °F (36.3 °C)   Resp 17   Ht 5' 3\" (1.6 m)   Wt 44.9 kg (99 lb)   SpO2 95%   BMI 17.54 kg/m²     SpO2 Readings from Last 6 Encounters:   11/09/20 95%   03/09/17 100%        No intake or output data in the 24 hours ending 11/09/20 0925     Physical Exam:    Gen:  Frail, cachectic, in no acute distress  HEENT:  Pink conjunctivae, PERRL, hearing intact to voice, drt mucous membranes  Neck:  Supple, without masses, thyroid non-tender  Resp:  No accessory muscle use, clear breath sounds without wheezes rales or rhonchi  Card:  No murmurs, normal S1, S2 without thrills, bruits or peripheral edema  Abd:  Soft, non-tender, non-distended, normoactive bowel sounds are present, no mass  Lymph:  No cervical or inguinal adenopathy  Musc:  No cyanosis or clubbing  Skin:  decubitus ulcers, skin turgor is poor   Neuro:  Cranial nerves are grossly intact, general motor weakness, does not follow commands appropriately  Psych:  Poor  Insight, not oriented    Telemetry reviewed:   normal sinus rhythm  __________________________________________________________________  Medications Reviewed: (see below)  Medications:     Current Facility-Administered Medications   Medication Dose Route Frequency    sodium chloride 0.9 % bolus infusion 1,000 mL  1,000 mL IntraVENous ONCE    sodium chloride (NS) flush 5-40 mL  5-40 mL IntraVENous Q8H    sodium chloride (NS) flush 5-40 mL  5-40 mL IntraVENous PRN    acetaminophen (TYLENOL) tablet 650 mg  650 mg Oral Q6H PRN    Or    acetaminophen (TYLENOL) suppository 650 mg  650 mg Rectal Q6H PRN    polyethylene glycol (MIRALAX) packet 17 g  17 g Oral DAILY PRN    pantoprazole (PROTONIX) injection 40 mg  40 mg IntraVENous Q24H    0.9% sodium chloride with KCl 20 mEq/L infusion   IntraVENous CONTINUOUS    cefepime (MAXIPIME) 2 g in sterile water (preservative free) 10 mL IV syringe  2 g IntraVENous Q12H    Vancomycin: pharmacy dosing by random level   Other Rx Dosing/Monitoring    clindamycin (CLEOCIN) 600mg NS 50 mL IVPB (premix)  600 mg IntraVENous Q8H    sodium chloride (NS) flush 5-10 mL  5-10 mL IntraVENous PRN        Lab Data Reviewed: (see below)  Lab Review:     Recent Labs     11/09/20 0101   WBC 18.3*   HGB 7.7*   HCT 24.7*        Recent Labs     11/09/20 0101     143   K 3.7  3.8   *  112*   CO2 22  26   *  102*   BUN 25*  26*   CREA 0.72  0.81   CA 8.3*  8.9   ALB 1.5*  1.7*   TBILI 0.3  0.3   ALT 23  28     No results found for: GLUCPOC  No results for input(s): PH, PCO2, PO2, HCO3, FIO2 in the last 72 hours. No results for input(s): INR, INREXT in the last 72 hours. All Micro Results     Procedure Component Value Units Date/Time    CULTURE, Simona Gonzalezo STAIN [[id number]] Collected:  11/09/20 0101    Order Status:  Completed Specimen:  Sacral Wound Updated:  11/09/20 0858    CULTURE, BLOOD [[id number]] Collected:  11/09/20 0101    Order Status:  Completed Specimen:  Blood Updated:  11/09/20 0644     Special Requests: NO SPECIAL REQUESTS        Culture result: NO GROWTH AFTER 5 HOURS       CULTURE, BLOOD [[id number]] Collected:  11/08/20 2011    Order Status:  Completed Specimen:  Blood Updated:  11/09/20 0644     Special Requests: NO SPECIAL REQUESTS        Culture result: NO GROWTH AFTER 9 HOURS       URINE CULTURE HOLD SAMPLE [[id number]]     Order Status:  Sent Specimen:  Urine           Other pertinent lab: none    Total time spent with patient: 39 Minutes,I personally rounded  from 9 AM to 9:45 AM , in addition to the time spent by my partner, Dr Jcarlos Nino, earlier today. I personally reviewed chart, notes, data and current medications in the medical record. I have personally examined and treated the patient at bedside during this period.   I personally made additional diagnoses, placed additional orders and had additional discussions.                  Care Plan discussed with: Patient, Nursing Staff, Consultant/Specialist and >50% of time spent in counseling and coordination of care    Discussed:  Care Plan and D/C Planning    Prophylaxis:  H2B/PPI    Disposition:  hospice           ___________________________________________________    Attending Physician: Susanna Steele MD

## 2020-11-09 NOTE — ED PROVIDER NOTES
Patient is a 29-year-old female with past medical history significant for pacemaker seizure disorder sick sinus syndrome and stroke who was brought in by her family for evaluation of sacral wounds that had now have a foul odor. Family member states that they have a home health nurse coming and have been using specialized dressings however recently started noticed some discharge and some foul odor. He denies any known fever. Family member states that the patient stated she did not want to go to the ER but she convinced her to come due to their concern over infection. Patient not answering questions for this examiner however patient's family's does state that she has been eating but admits that she only eats what certain people make her. She states she tries to give her calorie adjuncts like Ensure. Patient brought back to the room family member was no longer present. Patient appears to be very hard of hearing but still does not answer any questions. Unclear if this is her baseline. Past Medical History:   Diagnosis Date    Dizziness     Essential hypertension     Glaucoma     [facility]onsust[facility] 7/10/2012    Pacemaker     [facility] [address]     Sick sinus syndrome ([facility] [address]     Stroke [facility])        Past Surgical History:   Procedure Laterality Date    HX PACEMAKER           No family history on file.     Social History     Socioeconomic History    Marital status: SINGLE     Spouse name: Not on file    Number of children: Not on file    Years of education: Not on file    Highest education level: Not on file   Occupational History    Not on file   Social Needs    Financial resource strain: Not on file    Food insecurity     Worry: Not on file     Inability: Not on file    Transportation needs     Medical: Not on file     Non-medical: Not on file   Tobacco Use    Smoking status: Never Smoker    Smokeless tobacco: Never Used   Substance and Sexual Activity  Alcohol use: No    Drug use: No    Sexual activity: Not on file   Lifestyle    Physical activity     Days per week: Not on file     Minutes per session: Not on file    Stress: Not on file   Relationships    Social connections     Talks on phone: Not on file     Gets together: Not on file     Attends Alevism service: Not on file     Active member of club or organization: Not on file     Attends meetings of clubs or organizations: Not on file     Relationship status: Not on file    Intimate partner violence     Fear of current or ex partner: Not on file     Emotionally abused: Not on file     Physically abused: Not on file     Forced sexual activity: Not on file   Other Topics Concern    Not on file   Social History Narrative    Not on file         ALLERGIES: Patient has no known allergies. Review of Systems   Unable to perform ROS: Patient nonverbal       Vitals:    11/09/20 0030 11/09/20 0045 11/09/20 0100 11/09/20 0115   BP: 129/84 139/74 (!) 138/52 (!) 141/62   Pulse:  (!) 113 (!) 113 (!) 108   Resp:  21 19 16   Temp:       SpO2:       Weight:       Height:                Physical Exam  Vitals signs and nursing note reviewed. Constitutional:       General: She is not in acute distress. Comments: Patient severely cachectic and contracted. HENT:      Head: Atraumatic. Right Ear: External ear normal.      Left Ear: External ear normal.      Mouth/Throat:      Mouth: Mucous membranes are dry. Eyes:      General: No scleral icterus. Comments: Arcus senilis noted   Cardiovascular:      Rate and Rhythm: Tachycardia present. Comments: Pacemaker present left upper chest  Pulmonary:      Effort: Pulmonary effort is normal.      Breath sounds: No stridor. No rhonchi. Abdominal:      Palpations: Abdomen is soft. Musculoskeletal:      Right lower leg: No edema. Left lower leg: No edema. Skin:     General: Skin is warm and dry.       Comments: Multiple stage IV sacral decubitus ulcers noted with purulent foul-smelling discharge. Neurological:      Mental Status: She is alert. Comments: Patient nonverbal and contracted, awake and alert however does not follow commands. Does localize the pain          MDM  Number of Diagnoses or Management Options  Adult failure to thrive:   Cellulitis of buttock:   Dehydration:   Pleural effusion:   Sacral decubitus ulcer, stage IV ([facility] [address]):      Amount and/or Complexity of Data Reviewed  Clinical lab tests: reviewed and ordered  Tests in the radiology section of CPT®: ordered and reviewed  Discuss the patient with other providers: yes  Independent visualization of images, tracings, or specimens: yes    Risk of Complications, Morbidity, and/or Mortality  Presenting problems: high  Diagnostic procedures: high  Management options: high    Patient Progress  Patient progress: improved         Left EJ placement    Date/Time: 11/9/2020 1:00 AM  Performed by: Kyle Warner MD  Authorized by: Kyle Warner MD     Consent:     Consent obtained:  Emergent situation    Risks discussed:  Bleeding    Alternatives discussed:  Alternative treatment  Indications:     Indications:  IV access  Pre-procedure details:     Skin preparation:  ChloraPrep  Anesthesia (see MAR for exact dosages): Anesthesia method:  None  Post-procedure details:     Patient tolerance of procedure:   Tolerated well, no immediate complications  Critical Care  Performed by: Kyle Warner MD  Authorized by: Kyle Warner MD     Critical care provider statement:     Critical care time (minutes):  35    Critical care time was exclusive of:  Separately billable procedures and treating other patients    Critical care was necessary to treat or prevent imminent or life-threatening deterioration of the following conditions:  Dehydration    Critical care was time spent personally by me on the following activities:  Blood draw for specimens, ordering and performing treatments and interventions, ordering and review of laboratory studies, ordering and review of radiographic studies, development of treatment plan with patient or surrogate, evaluation of patient's response to treatment, examination of patient, obtaining history from patient or surrogate, re-evaluation of patient's condition and review of old charts    I assumed direction of critical care for this patient from another provider in my specialty: no          EKG obtained at 2304 showing sinus tachycardia, rate 118, occasional PVCs, normal intervals, change compared to prior EKG. Perfect Serve Consult for Admission  2:19 AM    ED Room Number: ER06/06  Patient Name and age:  Branden Flores 80 y.o.  female  Working Diagnosis:   1. Sacral decubitus ulcer, stage IV ([address]    2. Cellulitis of buttock    3. Pleural effusion    4. Dehydration    5. Adult failure to thrive        COVID-19 Suspicion:  no  Sepsis present:  no  Reassessment needed: no  Code Status:  Full Code  Readmission: no  Isolation Requirements:  no  Recommended Level of Care:  telemetry  Department:[facility] ED - [phone]  Other:  Pt is a 79 y/o female with pmh significant for  Seizure d/o, prior stroke, pacemaker, who was brought in by family for evaluation of foul odor and purulent drainage from sacral wounds. She has several sacral decubitus ulcers several of which are stage IV with active purulent foul-smelling drainage. Patient is cachectic. Blood pressure stable but heart rate has been elevated. Lactate not elevated. CT shows her wounds extend to the inferior pubic rami on the left. Wounds cultured. She has a left-sided EJ for access. Some of her labs are clotted so repeat labs are being sent.

## 2020-11-09 NOTE — PROGRESS NOTES
Occupational Therapy:  11/09/20    Orders received, chart reviewed and patient evaluated by occupational therapy. Pending progression with skilled acute occupational therapy, recommend:  Pt is not likely to benefit from skilled therapy services due to setting of advanced dementia and decreased command following. Noted per chart discussions for hospice with family. Pt will require 24/7 care. Recommend with nursing patient to complete as able in order to maintain strength, endurance and independence: turn team due to multiple wounds/ulcers; padding with pillows due to contractures to protect bony prominences; ROM to UEs/LEs as tolerated to prevent further contractures; Assistance for all ADLs; bed level dependent toileting. Thank you for your assistance. Full evaluation to follow.      Thank you,  Cem Moody, OTR/L

## 2020-11-09 NOTE — PROGRESS NOTES
PHYSICAL THERAPY EVALUATION/DISCHARGE  Patient: Mannie Nascimento (59 y.o. female)  Date: 11/9/2020  Primary Diagnosis: Sepsis ([facility] [address] [A41.9]  Decubitus ulcer of sacral region, stage 4 ([facility] [address] [L89.154]  Cellulitis of multiple sites of buttock [L03.317]  Dehydration [E86.0]  Failure to thrive in adult [R62.7]  Leukocytosis [D72.829]       Precautions:   Skin, DNR, Fall      ASSESSMENT  Based on the objective data described below, the patient presents with bilateral knee flexion contractures, total dependency for all bed mobility and supine to sit, poor sitting balance, inattention to left, poor command following, and inability to participate in PT efforts. Patient arrived in ED on 11/8 with stage 4 sacral decubitus which is malodorous. She has history of HTN, pacemaker, dementia and right frontal lobe infarct. CT of head negative for acute process at this time. Patient is essentially non-verbal but says an occasional word with very low volume and hard to understand. Patient required total assist of 2 to sit on edge of bed. She has kyphotic posture, leans and looks  to right and does not correct without assistance. Patient with severe knee flexion contractures of 90 degrees on left and 75 degrees on right which makes attempts at ambulation not feasible. Patient is not appropriate for PT and would be best served by being on turn team for frequent turning  with appropriate pillow placement to reduce skin breakdown. Patient may need long term placement if family unable to care for her. Functional Outcome Measure: The patient scored 0/0 on the Barthel outcome measure. Other factors to consider for discharge: patient requires total care     Further skilled acute physical therapy is not indicated at this time. PLAN :  Recommendation for discharge: (in order for the patient to meet his/her long term goals)  No skilled physical therapy/ follow up rehabilitation needs identified at this time.     This discharge recommendation:  Has not yet been discussed the attending provider and/or case management    IF patient discharges home will need the following DME: none       SUBJECTIVE:   Patient stated No (when asked if she has pain).     OBJECTIVE DATA SUMMARY:   HISTORY:    Past Medical History:   Diagnosis Date    Dizziness     Essential hypertension     Glaucoma     Hx of completed stroke     NSVT (nonsustained ventricular tachycardia) ([facility] [address] 7/10/2012    Pacemaker     [facility]     Sick sinus syndrome ([facility])      Past Surgical History:   Procedure Laterality Date    HX PACEMAKER         Prior level of function: unclear  Personal factors and/or comorbidities impacting plan of care: severe knee flexion contractures, poor command following, inability to participate with PT, medical history    Home Situation  Home Environment: Private residence  One/Two Story Residence: One story  Living Alone: No  Support Systems: Child(marco antonio), Family member(s)  Patient Expects to be Discharged to[de-identified] Private residence  Current DME Used/Available at Home: [address], Wheelchair, Suzzane Haggis, Shower chair    EXAMINATION/PRESENTATION/DECISION MAKING:   Critical Behavior:  Neurologic State: Eyes open to voice  Orientation Level: Unable to verbalize  Cognition: Unable to assess (comment)  Safety/Judgement: Lack of insight into deficits, Decreased awareness of environment  Hearing: Auditory  Auditory Impairment: None  Skin:  Not fully observed    Range Of Motion:  AROM: Grossly decreased, non-functional(bilateral knee flexion contractures; right slightly less than left)                       Strength:    Strength: Grossly decreased, non-functional                    Tone & Sensation:   Tone: Abnormal                                      Functional Mobility:  Bed Mobility:  Rolling:  Total assistance;Assist x2  Supine to Sit: Assist x2;Total assistance  Sit to Supine: Assist x2;Total assistance  Scooting: Assist x2;Total assistance  Transfers:                             Balance:   Sitting: Impaired  Sitting - Static: Supported sitting(leans to right)  Sitting - Dynamic: Supported sitting  Standing: (not attempted)  Ambulation/Gait Training:                                                      Not tested. Functional Measure:  Barthel Index:    Bathin  Bladder: 0  Bowels: 0  Groomin  Dressin  Feedin  Mobility: 0  Stairs: 0  Toilet Use: 0  Transfer (Bed to Chair and Back): 0  Total: 0/100       The Barthel ADL Index: Guidelines  1. The index should be used as a record of what a patient does, not as a record of what a patient could do. 2. The main aim is to establish degree of independence from any help, physical or verbal, however minor and for whatever reason. 3. The need for supervision renders the patient not independent. 4. A patient's performance should be established using the best available evidence. Asking the patient, friends/relatives and nurses are the usual sources, but direct observation and common sense are also important. However direct testing is not needed. 5. Usually the patient's performance over the preceding 24-48 hours is important, but occasionally longer periods will be relevant. 6. Middle categories imply that the patient supplies over 50 per cent of the effort. 7. Use of aids to be independent is allowed. Shelia Hughes., Barthel, D.W. (1362). Functional evaluation: the Barthel Index. [facility] (14)2. Lorre Gums esther [place], J.J.M.F, Anny Sport., Dewalyla Gomez., [place], [address] [place] [address] (). Measuring the change indisability after inpatient rehabilitation; comparison of the responsiveness of the Barthel Index and Functional Stephens Measure. Journal of Neurology, Neurosurgery, and Psychiatry, 66(4), 057-642. Que Presley, N.[place].A, JEMMA Hayden.ALLA, & Elizabeth Baltazar, MaMrkA. (2004.) Assessment of post-stroke quality of life in cost-effectiveness studies:  The usefulness of the Barthel Index and the EuroQoL-5D. Quality of Life Research, 15, 330-99          Physical Therapy Evaluation Charge Determination   History Examination Presentation Decision-Making   HIGH Complexity :3+ comorbidities / personal factors will impact the outcome/ POC  LOW Complexity : 1-2 Standardized tests and measures addressing body structure, function, activity limitation and / or participation in recreation  MEDIUM Complexity : Evolving with changing characteristics  Other outcome measures Barthel  HIGH       Based on the above components, the patient evaluation is determined to be of the following complexity level: LOW     Pain Rating:  None reported    Activity Tolerance:   Good      After treatment patient left in no apparent distress:   Call bell within reach, Bed / chair alarm activated, Side rails x 3 and sidelying in bed with pillow support     COMMUNICATION/EDUCATION:   The patients plan of care was discussed with: Occupational therapist and Registered nurse. Patient is unable to participate in goal setting and plan of care.     Thank you for this referral.  Gearline Drain, PT   Time Calculation: 25 mins

## 2020-11-09 NOTE — ED TRIAGE NOTES
Pt arrives with family member for wound check and concerns for infection. Family member states pt has chronic sacral wounds and reports new foul odor coming from wound.

## 2020-11-09 NOTE — PROGRESS NOTES
Patient bladder scanned retaining 650  Currently on bed lanza  Dr Zulma vernon served awaiting orders

## 2020-11-09 NOTE — PROGRESS NOTES
[address] Dosing Services: Antimicrobial Stewardship Progress Note 11/9/2020    Consult for antibiotic dosing of Vancomycin plus cefepime by Dr. Sonia Klein and metronidazole per protocol  Pharmacist reviewed antibiotic appropriateness for 80year old , female  for indication of multiple decubitus ulcers/sepsis  Day of Therapy 1    Plan:  Vancomycin therapy:  Start Vancomycin therapy, with loading dose of 1000 (mg) at [address] 11/09/20. Follow with maintenance dose of: 500 mg Q24 hours  Dose calculated to approximate a therapeutic trough of 15-20 mcg/mL. Last trough level / Plan for level: Patient with low CrCl ~30 ml/min due to age however Scr is at baseline and no apparent CARLY. Patient did receive a 1000 mg loading dose. Will start 500 mg Q24 hours due to low body weight. Calculated kinetics predict an initial trough 15.3 mcg/ml and  on this regimen. Plan for Css trough prior to 3rd total dose on adjusted regimen (not yet ordered). Daily creatinine per protocol. Pharmacy to follow daily and will make changes to dose and/or frequency based on clinical status. Date Dose & Interval Measured (mcg/mL) Extrapolated (mcg/mL)   ? ? ? ?   ? ? ? ?   ? ? ? ? Non-Kinetic Antimicrobial Dosing:   Current Regimen:   Cefepime 2 grams IV q12h  Recommendation: Continue the same. CrCl 30-60 ml/min. Non-Kinetic Antimicrobial Dosing:   Current Regimen:   Metronidazole 500 mg Q6 hours  Recommendation: Metronidazole dose adjusted to 500 mg Q12 hours per P&T approved protocol    Other Antimicrobial  (not dosed by pharmacist)   None   Cultures     11/8 Blood: NGTD, Prelim  11/8 Wound: Pending   Serum Creatinine     Lab Results   Component Value Date/Time    Creatinine 0.81 11/09/2020 01:01 AM    Creatinine 0.72 11/09/2020 01:01 AM       Creatinine Clearance Estimated Creatinine Clearance: 30.8 mL/min (based on SCr of 0.81 mg/dL).      Procalcitonin    Lab Results   Component Value Date/Time    Procalcitonin 0.15 11/09/2020 01:01 AM        Temp   97.3 °F (36.3 °C)    WBC   Lab Results   Component Value Date/Time    WBC 18.3 (H) 11/09/2020 01:01 AM       H/H   Lab Results   Component Value Date/Time    HGB 7.7 (L) 11/09/2020 01:01 AM      Platelets Lab Results   Component Value Date/Time    PLATELET 250 47/85/7841 01:01 AM          Thanks,  Pharmacist: ALEKSEY IglesiasD Contact information: [phone]

## 2020-11-09 NOTE — PROGRESS NOTES
[facility] Dosing Services: 11/9/2020    Pharmacist Renal Dosing Progress Note for Famotidine   Physician Dr. Adams Haywood    Famotidine dose adjusted to 20 mg Q24 hours for CrCl <50 ml/min per protocol    Pt Weight:   Wt Readings from Last 1 Encounters:   11/08/20 44.9 kg (99 lb)     Previous Regimen 20 mg BID   Serum Creatinine Lab Results   Component Value Date/Time    Creatinine 0.81 11/09/2020 01:01 AM    Creatinine 0.72 11/09/2020 01:01 AM       Creatinine Clearance Estimated Creatinine Clearance: 30.8 mL/min (based on SCr of 0.81 mg/dL). BUN Lab Results   Component Value Date/Time    BUN 26 (H) 11/09/2020 01:01 AM    BUN 25 (H) 11/09/2020 01:01 AM         Pharmacy to continue to monitor patient daily. Will make dosage adjustments based upon changing renal function.   Signed Ziggy [address] information:  [phone]

## 2020-11-09 NOTE — PROGRESS NOTES
[facility]: MED RECONCILIATION    Comments/Recommendations:   Patient is nonverbal. Reviewed PTA medications with patient's granddaughter over the phone. Patient has all Rx bottles at bedside. Per granddaughter the famotidine in the bag is her medication, and the patient does not take anything for GERD. All antihypertensives (amlodipine, lisinopril) have been discontinued. Patient crushes all medications. She opens phenytoin capsules and takes with apple sauce. Updated preferred pharmacy, and NKDA    Information obtained from: Home prescription bottles, Patient's granddaughter    Allergies: Patient has no known allergies. Prior to Admission Medications:     Medication Documentation Review Audit       Reviewed by Rakesh Espinoza PHARMD (Pharmacist) on 11/09/20 at 1009      Medication Sig Documenting Provider Last Dose Status Taking?   aspirin 81 mg chewable tablet Take 81 mg by mouth daily. Provider, Historical  Active Yes   brimonidine (ALPHAGAN) 0.2 % ophthalmic solution Administer 1 Drop to both eyes three (3) times daily. Provider, Historical  Active Yes   calcium carbonate-vitamin D3 600 mg(1,500mg) -800 unit tab Take 1 Tab by mouth every evening. Provider, Historical  Active Yes   cholecalciferol (VITAMIN D3) (5000 Units/125 mcg) tab tablet Take 5,000 Units by mouth daily. Provider, Historical  Active Yes   cyanocobalamin, vitamin B-12, 5,000 mcg TbDi Take 1 Tab by mouth daily. Provider, Historical  Active Yes   donepezil (ARICEPT) 10 mg tablet Take 10 mg by mouth nightly. Provider, Historical  Active Yes   dorzolamide (TRUSOPT) 2 % ophthalmic solution Administer 1 Drop to both eyes two (2) times a day. Provider, Historical  Active Yes   latanoprost (XALATAN) 0.005 % ophthalmic solution Administer 1 Drop to both eyes nightly. Provider, Historical  Active Yes   OTHER Patient crushes medications. She opens phenytoin capsules and takes this with apple sauce.  Provider, Historical  Active Yes   phenytoin ER (DILANTIN ER) 100 mg ER capsule Take 200 mg by mouth daily. Phenytoin 200 mg AM and 100 mg PM Provider, Historical 11/9/2020 AM Active Yes   phenytoin ER (DILANTIN ER) 100 mg ER capsule Take 100 mg by mouth nightly. Phenytoin 200 mg AM and 100 mg PM Provider, Historical 11/8/2020 PM Active Yes   senna-docusate (Senna-S) 8.6-50 mg per tablet Take 1 Tab by mouth every evening. Provider, Historical  Active Yes   simvastatin (ZOCOR) 20 mg tablet Take 20 mg by mouth nightly.  Provider, Historical  Active Yes                  ThanksLucas [place], [place]   Contact: [phone]

## 2020-11-09 NOTE — PROGRESS NOTES
Spiritual Care Assessment/Progress Note  Corie Evans      NAME: Alvarez Trejo      MRN: [id number]  AGE: 80 y.o.  SEX: female  [place] Affiliation: Unknown   Language: English     11/9/2020     Total Time (in minutes): 7     Spiritual Assessment begun in [facility]  MED SURG 2 through conversation with:         [x]Patient        [] Family    [] Friend(s)        Reason for Consult: Palliative Care, Initial/Spiritual Assessment     Spiritual beliefs: (Please include comment if needed)     [] Identifies with a oma tradition:         [] Supported by a oma community:            [] Claims no spiritual orientation:           [] Seeking spiritual identity:                [] Adheres to an individual form of spirituality:           [x] Not able to assess:                           Identified resources for coping:      [] Prayer                               [] Music                  [] Guided Imagery     [x] Family/friends                 [] Pet visits     [] Devotional reading                         [] Unknown     [] Other:                                               Interventions offered during this visit: (See comments for more details)    Patient Interventions: Affirmation of emotions/emotional suffering, Affirmation of oma, Normalization of emotional/spiritual concerns, Prayer (assurance of)           Plan of Care:     [x] Support spiritual and/or cultural needs    [] Support AMD and/or advance care planning process      [] Support grieving process   [] Coordinate Rites and/or Rituals    [] Coordination with community clergy   [] No spiritual needs identified at this time   [] Detailed Plan of Care below (See Comments)  [] Make referral to Music Therapy  [] Make referral to Pet Therapy     [] Make referral to Addiction services  [] Make referral to [facility]  [] Make referral to Spiritual Care Partner  [] No future visits requested        [x] Follow up visits as needed     Comments:  visit per palliative consult. Pt was not able to communicate or talk with .  provided pastoral presence and assurance of prayer. Please contact [phone] [facility] for further support.  follow up as needed.      [address] Adam Nam, MACE   [phone])

## 2020-11-09 NOTE — H&P
[facility]  [address] [facility], [facility] [address]  [phone]     Hospitalist Admission Note      NAME: Erin Tejada   :  1926   MRN:  [id number]     Date/Time:  2020 3:48 AM    Patient PCP: Dolores Beck MD    Emergency Contact:    Extended Emergency Contact Information  [place] Emergency Contact[place] Phone: [phone]  Relation: Grandchild   needed? No  Secondary Emergency Contact: [address] Phone: [phone]  Relation: Other Relative      Isolation Precautions: There are currently no Active Isolations  ________________________________________________________________________________________________      CC:   Malodorous sacral wounds    HPI:     Erin Tejada is a 80 y.o. female with history that includes prior strokes, seizures, and sick sinus syndrome with pacer brought in by family for evaluation of foul-smelling decubitus ulcers in the sacral and buttocks region. Patient is very cachectic and appears mentally altered at this time she is nonverbal with us but I do not know her baseline but the CT scan does show encephalomalacia. Unable to obtain history or review of system from patient. She was started on cefepime and vancomycin in the ER and we were asked to admit for further work-up and treatment. By the time I received the notice for admission the family was gone. I called and spoke with her granddaughter Alexandria Funk at the number listed above to clarify CODE STATUS. She said she is the decision-maker for her grandmother and that given her age it would be appropriate to make her DNR. It was very difficult to understand her over the phone but she seems to have realistic expectations. With regards to her mentation she says that the patient speaks very little but does communicate with her but the patient did not speak with the ER physician or me.       Past Medical History:   Diagnosis Date    Dizziness     Essential hypertension     Glaucoma     NSVT [facility]onsustained ventricular tachycardia) [facility] 7/10/2012    Pacemaker     Seizure disorder ([facility] [address] [facility]     Sick sinus syndrome ([facility] [address] [facility]     Stroke [facility])         Past Surgical History:   Procedure Laterality Date    HX PACEMAKER         Social History     Tobacco Use    Smoking status: Never Smoker    Smokeless tobacco: Never Used   Substance Use Topics    Alcohol use: No        No family history on file. No Known Allergies     Prior to Admission medications    Medication Sig Start Date End Date Taking? Authorizing Provider   calcium-vitamin D 600 mg(1,500mg) -200 unit tab Take 1 Tab by mouth two (2) times daily (with meals). Other, MD Kaiden   famotidine (PEPCID) 20 mg tablet Take 20 mg by mouth two (2) times a day. Other, MD Kaiden   lisinopril (PRINIVIL, ZESTRIL) 20 mg tablet Take  by mouth daily. Provider, Historical   simvastatin (ZOCOR) 40 mg tablet Take  by mouth nightly. Provider, Historical   dorzolamide (TRUSOPT) 2 % ophthalmic solution Administer 1 Drop to both eyes two (2) times a day. Provider, Historical   omega-3 fatty acids-vitamin e (FISH OIL) 1,000 mg Cap Take 1 Cap by mouth. Provider, Historical   donepezil (ARICEPT) 10 mg tablet Take 10 mg by mouth nightly. Provider, Historical   multivitamin (ONE A DAY) tablet Take 1 Tab by mouth daily. Provider, Historical   amLODIPine (NORVASC) 5 mg tablet Take 5 mg by mouth daily. Provider, Historical   aspirin 81 mg tablet Take  by mouth. Provider, Historical   bimatoprost (LUMIGAN) 0.03 % ophthalmic drops Administer 1 Drop to both eyes every evening. Provider, Historical   ERGOCALCIFEROL, VITAMIN D2, (VITAMIN D PO) Take 2,000 Units by mouth daily. Provider, Historical   PHENYTOIN SODIUM EXTENDED (DILANTIN PO) Take 200 mg by mouth daily. 11/3/10   Provider, Historical   BRIMONIDINE TARTRATE (BRIMONIDINE OP) Apply  to eye. Provider, Historical     *[address] [place] [address] reviewed. ROS:  Patient is nonverbal and unable to obtain ROS. PE:    Visit Vitals  /68   Pulse (!) 116   Temp 97.5 °F (36.4 °C)   Resp 18   Ht 5' 3\" (1.6 m)   Wt 44.9 kg (99 lb)   SpO2 99%   BMI 17.54 kg/m²     General:  Appears very frail and cachectic. Unable to communicate at this time. Head:  Normocephalic, without obvious abnormality, atraumatic. Eyes:  Conjunctivae/corneas clear. PERRL   Ears:  Normal external ear canals both ears. Nose: Nares normal.   Throat:  Oral mucosa dry but pink   Neck: Supple, symmetrical, trachea midline,   Back:    Appears to have some contracture unable to straighten out and examined. .   Lungs:   Clear to auscultation bilaterally. Heart:   Tachycardic, S1, S2 normal   Abdomen:   Soft, non-tender. Bowel sounds normal.    Extremities:  Contracture of extremities. Very thin   Skin:  Stage IV decubitus ulcers in the sacral and buttocks region and appears to also have the ischial tuberosity. Malodorous with drainage and appears to have some necrotic tissue. LN: Cervical, supraclavicular normal.   Neurologic:  Unable to assess given the patient's current mental status. Data Review: All diagnostic labs and studies have been reviewed. ECG: Sinus tachycardia at 118 I reviewed the image myself. Labs:  Recent Labs     11/09/20 0101   WBC 18.3*   HGB 7.7*   HCT 24.7*        Recent Labs     11/09/20 0101     143   K 3.7  3.8   *  112*   CO2 22  26   BUN 25*  26*   CREA 0.72  0.81   *  102*   CA 8.3*  8.9     Recent Labs     11/09/20 0101   ALT 23  28   AP 76  83   TBILI 0.3  0.3   TP 7.4  8.1   ALB 1.5*  1.7*   GLOB 5.9*  6.4*     No results for input(s): INR, PTP, APTT, INREXT in the last 72 hours. No results for input(s): PH, PCO2, PO2 in the last 72 hours.   Lab Results   Component Value Date/Time    Color YELLOW/STRAW 03/09/2017 08:28 AM    Appearance CLEAR 03/09/2017 08:28 AM Specific gravity 1.007 03/09/2017 08:28 AM    pH (UA) 7.5 03/09/2017 08:28 AM    Protein NEGATIVE  03/09/2017 08:28 AM    Glucose NEGATIVE  03/09/2017 08:28 AM    Ketone NEGATIVE  03/09/2017 08:28 AM    Bilirubin NEGATIVE  03/09/2017 08:28 AM    Urobilinogen 0.2 03/09/2017 08:28 AM    Nitrites NEGATIVE  03/09/2017 08:28 AM    Leukocyte Esterase NEGATIVE  03/09/2017 08:28 AM    Epithelial cells 0-5 11/05/2010 01:00 PM    Bacteria NEGATIVE  11/05/2010 01:00 PM    WBC 0-4 11/05/2010 01:00 PM    RBC 0-3 11/05/2010 01:00 PM     All Micro Results     Procedure Component Value Units Date/Time    CULTURE, Vonzell Libel STAIN [[id number]] Collected:  11/09/20 0101    Order Status:  Completed Specimen:  Sacral Wound Updated:  11/09/20 0115    CULTURE, BLOOD [[id number]] Collected:  11/09/20 0101    Order Status:  Completed Specimen:  Blood Updated:  11/09/20 0113    CULTURE, BLOOD [[id number]] Collected:  11/08/20 2011    Order Status:  Completed Specimen:  Blood Updated:  11/08/20 2050    URINE CULTURE HOLD SAMPLE [[id number]]     Order Status:  Sent Specimen:  Urine           Imaging:  Ct Head Wo Cont    Result Date: 11/9/2020  IMPRESSION: No acute intracranial process. Large area of encephalomalacia in the right frontal lobe related to remote infarction. Prior craniectomy. Imaging findings consistent with severe chronic microvascular ischemic change. There is a severe degree of cerebral atrophy. Ct Abd Pelv Wo Cont    Result Date: 11/9/2020  IMPRESSION: Sacral decubitus remote wound soft tissue defect that extends to the margin of the inferior pubic ramus on the left. Cardiomegaly with moderate left pleural effusion and left basilar atelectasis. Xr Chest Port    Result Date: 11/8/2020  IMPRESSION: Interval moderate to large left-sided pleural effusion with left basilar atelectasis. .     I personally reviewed imaging studies.       A/P:  24-year-old female brought in by family for infected malodorous sacral decubitus wound stage IV. Patient at this time is nonverbal and appears to be encephalopathic due to sepsis based on leukocytosis tachycardia and infected decubitus sacral ulcer. Sepsis ([address] (11/9/2020) due to cellulitis and infected decubitus ulcer POA: Patient admitted for further work-up and treatment. Blood cultures pending. Patient started on vancomycin and cefepime for cellulitis and possible osteomyelitis. Will add clinda for anaerobic coverage. Consider ID consult. IV fluids and started on the sepsis protocol. Cellulitis of multiple sites of buttock (11/9/2020) POA: Related to decubitus ulcers may likely need debridement. Will consult wound care. Consider surgical evaluation for possible debridement. Treat as above. Decubitus ulcer of sacral region, stage 4 ([address] (11/9/2020) POA multiple sites: Wound care consult and treat as above. Acute metabolic encephalopathy (78/4/3277) POA: Likely related to sepsis and cellulitis. Unsure of baseline but  family states that she is able to communicate which she was not able to be with us. Will be made n.p.o. until evaluated by speech because I am afraid that she may aspirate. Failure to thrive in adult (11/9/2020): Nutritional consult. SLP. PT OT consult will likely need skilled on discharge. Severe protein calorie malnutrition: Nutrition consult. Leukocytosis (11/9/2020) 18.3 POA: Related to sepsis. Treat as above. Pacemaker for sick sinus syndrome. Overview: Dual chamber [employer] Sensia DOI 10/2010      Anemia (11/9/2020) hemoglobin 7.7 POA: Iron studies. Dehydration (11/9/2020): Will be on IV fluids. Given her lethargy has been very careful. CODE STATUS: DNR per her granddaughter.          Body mass index is 17.54 kg/m².: less than 18.5 Underweight         DVT Prophylaxis: SCD's  GI Prophylaxis: PPI   Disposition: TBD    Care Plan discussed with:  [x]Patient   [x]Family    [x]ED Doc  [x]RN   []Specialist []Care Manager     **Chart reviewed including medications, vitals, notes, labs and pertinent studies. **Assessment, condition, and plan reviewed and all questions and concerns addressed. Patient is at high risk of decompensation and will need to receive treatment that can only be done on an inpatient basis which will require at 2 or more midnights to treat sepsis and infected decubitus ulcers.     Patient was seen After Midnight nutrition consult

## 2020-11-09 NOTE — CONSULTS
Comprehensive Nutrition Assessment    Type and Reason for Visit: Initial, Consult, Wound    Nutrition Recommendations/Plan:   1. Advance diet per SLP. 2. Will add nutrition supplements to promote wound healing based on SLP recs. 3. Please obtain new measured weight. Nutrition Assessment:      11/9: 79 y/o female admitted with sepsis, decub ulcer. PMH includes HTN, GERD, hc CVA. BMI 17.5, c/w underweight. Pt not communicating, no family in room at time of visit. No recent weight hx in chart, CBW pt stated so unsure of accuracy. Pt with severe fat and muscle wasting and currently meets criteria for severe malnutrition based on assessment below. Currently NPO, SLP consulted. Will add supplements for multiple wounds once diet advanced. Noted that hospice is also consulted. Will monitor goals of care. Labs- reviewed. Meds- Protonix. Weight hx: Wt Readings from Last 10 Encounters:   11/08/20 44.9 kg (99 lb)   03/09/17 54.4 kg (120 lb)   04/11/14 48.5 kg (107 lb)   01/07/14 46.3 kg (102 lb)   01/15/13 49.4 kg (109 lb)   07/10/12 50.3 kg (111 lb)   11/29/11 48.1 kg (106 lb)   05/24/11 49.4 kg (108 lb 12.8 oz)   11/05/10 46.1 kg (101 lb 9.6 oz)     Malnutrition Assessment:  Malnutrition Status:  Severe malnutrition    Context:  Chronic illness     Findings of the 6 clinical characteristics of malnutrition:   Energy Intake:  Unable to assess  Weight Loss:  Unable to assess     Body Fat Loss:  7 - Severe body fat loss, Triceps, Fat overlying ribs   Muscle Mass Loss:  7 - Severe muscle mass loss, Temples (temporalis), Clavicles (pectoralis &deltoids)  Fluid Accumulation:  Unable to assess,     Strength:  Not performed     Estimated Daily Nutrient Needs:  Energy (kcal): 5000(079 x 1.3 AF (+250)); Weight Used for Energy Requirements: Current  Protein (g): 67(1.2-1.5 g/kg);  Weight Used for Protein Requirements: Current  Fluid (ml/day): 1319; Method Used for Fluid Requirements: 1 ml/kcal    Nutrition Related Findings:  LBM unknown      Wounds:    Stage IV, Multiple(stage 4 sacral, buttocks, stage 2 hip)       Current Nutrition Therapies:  DIET NPO    Anthropometric Measures:  · Height:  5' 3\" (160 cm)  · Current Body Wt:  44.9 kg (99 lb)   · Admission Body Wt:       · Usual Body Wt:        · Ideal Body Wt:  115 lbs:  86.1 %   · Adjusted Body Weight:   ; Weight Adjustment for: No adjustment    · BMI Category:  Underweight (BMI less than 18.5)       Nutrition Diagnosis:   · Inadequate oral intake related to inadequate protein-energy intake as evidenced by NPO or clear liquid status due to medical condition    Nutrition Interventions:   Food and/or Nutrient Delivery: Modify current diet, Start oral nutrition supplement  Nutrition Education and Counseling: No recommendations at this time  Coordination of Nutrition Care: Continue to monitor while inpatient, Swallow evaluation    Goals:  Diet advancement with po intake >50% meals + ONS meeting protein needs next 1-3 days       Nutrition Monitoring and Evaluation:   Behavioral-Environmental Outcomes: None identified  Food/Nutrient Intake Outcomes: Food and nutrient intake, Diet advancement/tolerance, Supplement intake  Physical Signs/Symptoms Outcomes: Weight, Skin, Nutrition focused physical findings, Biochemical data    Discharge Planning:     Too soon to determine     Electronically signed by Sajan Klein RDN on 11/9/2020 at 10:29 AM    Contact: [phone]

## 2020-11-09 NOTE — CONSULTS
Surgery Consult    Subjective:      Deanne Arvizu is a 80 y.o. female who presents with malnourishment and has extensive medical issues. General surgery called for multiple decubitus ulcers to evaluate for debridement  Patient being followed by wound care    Past Medical History:   Diagnosis Date    Dizziness     Essential hypertension     Glaucoma     Hx of completed stroke     NSVT [facility]onsustained ventricular tachycardia) ([facility] 7/10/2012    Pacemaker     Seizure disorder ([facility]     Sick sinus syndrome ([facility]      Past Surgical History:   Procedure Laterality Date    HX PACEMAKER        No family history on file.   Social History     Socioeconomic History    Marital status: SINGLE     Spouse name: Not on file    Number of children: Not on file    Years of education: Not on file    Highest education level: Not on file   Tobacco Use    Smoking status: Never Smoker    Smokeless tobacco: Never Used   Substance and Sexual Activity    Alcohol use: No    Drug use: No      Current Facility-Administered Medications   Medication Dose Route Frequency    sodium chloride (NS) flush 5-40 mL  5-40 mL IntraVENous Q8H    sodium chloride (NS) flush 5-40 mL  5-40 mL IntraVENous PRN    acetaminophen (TYLENOL) tablet 650 mg  650 mg Oral Q6H PRN    Or    acetaminophen (TYLENOL) suppository 650 mg  650 mg Rectal Q6H PRN    polyethylene glycol (MIRALAX) packet 17 g  17 g Oral DAILY PRN    0.9% sodium chloride with KCl 20 mEq/L infusion   IntraVENous CONTINUOUS    cefepime (MAXIPIME) 2 g in sterile water (preservative free) 10 mL IV syringe  2 g IntraVENous Q12H    latanoprost (XALATAN) 0.005 % ophthalmic solution 1 Drop  1 Drop Both Eyes QHS    brimonidine (ALPHAGAN) 0.2 % ophthalmic solution 1 Drop  1 Drop Both Eyes TID    donepeziL (ARICEPT) tablet 10 mg  10 mg Oral QHS    dorzolamide (TRUSOPT) 2 % ophthalmic solution 1 Drop  1 Drop Both Eyes BID    famotidine (PEPCID) tablet 20 mg  20 mg Oral DAILY    [START ON 11/10/2020] vancomycin (VANCOCIN) 500 mg in 0.9% sodium chloride (MBP/ADV) 100 mL  500 mg IntraVENous Q24H    metroNIDAZOLE (FLAGYL) IVPB premix 500 mg  500 mg IntraVENous Q12H    morphine injection 1 mg  1 mg IntraVENous Q4H PRN    phenytoin ER (DILANTIN ER) ER capsule 200 mg  200 mg Oral DAILY    phenytoin ER (DILANTIN ER) ER capsule 100 mg  100 mg Oral QHS    sodium chloride (NS) flush 5-10 mL  5-10 mL IntraVENous PRN      No Known Allergies    Review of Systems:REVIEW OF SYSTEMS:     []     Unable to obtain  ROS due to  []    mental status change  []    sedated   []    intubated   [x]    Total of 12 systems reviewed as follows:    Constitutional: neg for fevers, chills, weight loss, malaise  Eyes: negative for blurry vision  Ears, nose, mouth, throat, and face: negative for sore throat  Respiratory: negative for SOB  Cardiovascular: negative for CP  Gastrointestinal: negative for nausea, vomiting, abdominal pain, diarrhea, constipation, melena, hematochezia  Genitourinary: negative for dysuria  Integument/breast: neg for skin rash. Decubitus ulcers back left side ulcers  Hematologic/lymphatic: neg for bruising  Musculoskeletal: negative for muscle aches  Neurological: no dizziness or h/a    Objective:        Patient Vitals for the past 8 hrs:   BP Temp Pulse Resp SpO2 Height   20 1515 130/63 97.3 °F (36.3 °C) 64 17 94 %    20 1459   91      20 1226 124/84 97.5 °F (36.4 °C) 97 16 94 %    20 1023      5' 3\" (1.6 m)       Temp (24hrs), Av.5 °F (36.4 °C), Min:97.3 °F (36.3 °C), Max:98.1 °F (36.7 °C)      Physical Exam:  General:  Alert, cooperative, no distress, appears stated age. Eyes:  Conjunctivae/corneas clear. Nose: Nares normal. Septum midline   Mouth/Throat: Lips, mucosa, and tongue normal.    Neck: Supple, symmetrical, trachea midline   Lungs:   Clear to auscultation bilaterally.    Heart:  Regular rate and rhythm   Abdomen:   Soft, non-tender, non-distended, no rebound, no guarding, normal bowel sounds   Extremities: Extremities normal, atraumatic, no cyanosis or edema. Skin: Several decubitus ulcers all appear to be stage 4 mainly left side, left buttock and back. Left buttock and back with some skin and fibrinous area that can be drebrided, but will cause minimal change in healing   Neuro: Alert, oriented, speech clear     Labs:   Recent Labs     11/09/20 0101   WBC 18.3*   HGB 7.7*   HCT 24.7*        Recent Labs     11/09/20 0101     143   K 3.7  3.8   *  112*   CO2 22  26   *  102*   BUN 25*  26*   CREA 0.72  0.81   CA 8.3*  8.9   ALB 1.5*  1.7*   TBILI 0.3  0.3   ALT 23  28     No results for input(s): INR, INREXT in the last 72 hours. Assessment and Plan:     81 y/o fmelae with multiple comorbidities and several decubitis pressure ulcers, all which appear to be stage 4. Non appear to be heavily infected. Some light debridement can be done to left buttock ulcer and back, only to clean up slight sloughing tissue, but they all go down to bone. Will need consent for bedside debridement, which is not urgent. Spoke to CM as to who to consent for this.   May continue dressing changes as recommended by wound care      Signed By: Sara Odonnell MD     November 9, 2020

## 2020-11-09 NOTE — PROGRESS NOTES
Case Management Note        Patient received 1st IM letter, did not sign left at bedside. Copy on chart.       Leslie Rodriguez BS

## 2020-11-09 NOTE — PROGRESS NOTES
Bedside, Verbal and Written shift change report given to [address] [place] [address] (oncoming nurse) by Duane Langton (offgoing nurse). Report included the following information SBAR, Kardex, ED Summary, Intake/Output, MAR, Recent Results and Med Rec Status.

## 2020-11-09 NOTE — PROGRESS NOTES
Per chart review (specifially progress notes and Media), vasiliy Alcala (was listed as Mely Smith prior to 2019) appears to have been caring for pt since at least 2010. October 2010 Cardiology H&P states pt had been living alone at that time but jessy Benavides were checking on pt daily. Phone number for iVcky Kaplan was incomplete in note. Durga Brightfranchesca is referred to as \"POA\" throughout chart, identifies self as such when signing paperwork on pt's behalf over the past 10 yrs. POA paperwork is not currently on file. No information re: other family members was noted. Palliative Medicine will plan to see pt/contact family on 11/10 to verify POA and discuss pt's care.

## 2020-11-10 LAB
ALBUMIN SERPL-MCNC: 1.4 G/DL (ref 3.5–5)
ALBUMIN/GLOB SERPL: 0.2 {RATIO} (ref 1.1–2.2)
ALP SERPL-CCNC: 64 U/L (ref 45–117)
ALT SERPL-CCNC: 20 U/L (ref 12–78)
ANION GAP SERPL CALC-SCNC: 6 MMOL/L (ref 5–15)
AST SERPL-CCNC: 31 U/L (ref 15–37)
BASOPHILS # BLD: 0.1 K/UL (ref 0–0.1)
BASOPHILS NFR BLD: 1 % (ref 0–1)
BILIRUB SERPL-MCNC: 0.3 MG/DL (ref 0.2–1)
BUN SERPL-MCNC: 22 MG/DL (ref 6–20)
BUN/CREAT SERPL: 35 (ref 12–20)
CALCIUM SERPL-MCNC: 8.2 MG/DL (ref 8.5–10.1)
CHLORIDE SERPL-SCNC: 121 MMOL/L (ref 97–108)
CO2 SERPL-SCNC: 21 MMOL/L (ref 21–32)
CREAT SERPL-MCNC: 0.62 MG/DL (ref 0.55–1.02)
DIFFERENTIAL METHOD BLD: ABNORMAL
EOSINOPHIL # BLD: 0.1 K/UL (ref 0–0.4)
EOSINOPHIL NFR BLD: 1 % (ref 0–7)
ERYTHROCYTE [DISTWIDTH] IN BLOOD BY AUTOMATED COUNT: 17.4 % (ref 11.5–14.5)
FERRITIN SERPL-MCNC: 741 NG/ML (ref 26–388)
FOLATE SERPL-MCNC: 19.9 NG/ML (ref 5–21)
GLOBULIN SER CALC-MCNC: 5.8 G/DL (ref 2–4)
GLUCOSE SERPL-MCNC: 68 MG/DL (ref 65–100)
HAPTOGLOB SERPL-MCNC: 275 MG/DL (ref 30–200)
HCT VFR BLD AUTO: 25.9 % (ref 35–47)
HGB BLD-MCNC: 7.7 G/DL (ref 11.5–16)
IMM GRANULOCYTES # BLD AUTO: 0.1 K/UL (ref 0–0.04)
IMM GRANULOCYTES NFR BLD AUTO: 1 % (ref 0–0.5)
IRON SATN MFR SERPL: 21 % (ref 20–50)
IRON SERPL-MCNC: 25 UG/DL (ref 35–150)
LDH SERPL L TO P-CCNC: 210 U/L (ref 81–246)
LYMPHOCYTES # BLD: 1.2 K/UL (ref 0.8–3.5)
LYMPHOCYTES NFR BLD: 12 % (ref 12–49)
MCH RBC QN AUTO: 29.7 PG (ref 26–34)
MCHC RBC AUTO-ENTMCNC: 29.7 G/DL (ref 30–36.5)
MCV RBC AUTO: 100 FL (ref 80–99)
MONOCYTES # BLD: 0.9 K/UL (ref 0–1)
MONOCYTES NFR BLD: 9 % (ref 5–13)
NEUTS SEG # BLD: 7.9 K/UL (ref 1.8–8)
NEUTS SEG NFR BLD: 77 % (ref 32–75)
NRBC # BLD: 0 K/UL (ref 0–0.01)
NRBC BLD-RTO: 0 PER 100 WBC
PHENYTOIN SERPL-MCNC: 5.5 UG/ML (ref 10–20)
PLATELET # BLD AUTO: 300 K/UL (ref 150–400)
PMV BLD AUTO: 11 FL (ref 8.9–12.9)
POTASSIUM SERPL-SCNC: 4 MMOL/L (ref 3.5–5.1)
PROT SERPL-MCNC: 7.2 G/DL (ref 6.4–8.2)
RBC # BLD AUTO: 2.59 M/UL (ref 3.8–5.2)
RETICS # AUTO: 0.05 M/UL (ref 0.02–0.08)
RETICS/RBC NFR AUTO: 1.9 % (ref 0.7–2.1)
SODIUM SERPL-SCNC: 148 MMOL/L (ref 136–145)
TIBC SERPL-MCNC: 118 UG/DL (ref 250–450)
VIT B12 SERPL-MCNC: >2000 PG/ML (ref 193–986)
WBC # BLD AUTO: 10.2 K/UL (ref 3.6–11)

## 2020-11-10 PROCEDURE — 92526 ORAL FUNCTION THERAPY: CPT

## 2020-11-10 PROCEDURE — 65660000000 HC RM CCU STEPDOWN

## 2020-11-10 PROCEDURE — 85045 AUTOMATED RETICULOCYTE COUNT: CPT

## 2020-11-10 PROCEDURE — 36415 COLL VENOUS BLD VENIPUNCTURE: CPT

## 2020-11-10 PROCEDURE — 85025 COMPLETE CBC W/AUTO DIFF WBC: CPT

## 2020-11-10 PROCEDURE — 83540 ASSAY OF IRON: CPT

## 2020-11-10 PROCEDURE — 83010 ASSAY OF HAPTOGLOBIN QUANT: CPT

## 2020-11-10 PROCEDURE — 74011000250 HC RX REV CODE- 250: Performed by: INTERNAL MEDICINE

## 2020-11-10 PROCEDURE — 74011250636 HC RX REV CODE- 250/636: Performed by: INTERNAL MEDICINE

## 2020-11-10 PROCEDURE — 74011000258 HC RX REV CODE- 258: Performed by: HOSPITALIST

## 2020-11-10 PROCEDURE — 65270000029 HC RM PRIVATE

## 2020-11-10 PROCEDURE — 80185 ASSAY OF PHENYTOIN TOTAL: CPT

## 2020-11-10 PROCEDURE — 83615 LACTATE (LD) (LDH) ENZYME: CPT

## 2020-11-10 PROCEDURE — 82607 VITAMIN B-12: CPT

## 2020-11-10 PROCEDURE — 82728 ASSAY OF FERRITIN: CPT

## 2020-11-10 PROCEDURE — 77030018836 HC SOL IRR NACL ICUM -A

## 2020-11-10 PROCEDURE — 74011250636 HC RX REV CODE- 250/636: Performed by: HOSPITALIST

## 2020-11-10 PROCEDURE — 94760 N-INVAS EAR/PLS OXIMETRY 1: CPT

## 2020-11-10 PROCEDURE — 99222 1ST HOSP IP/OBS MODERATE 55: CPT | Performed by: INTERNAL MEDICINE

## 2020-11-10 PROCEDURE — 99232 SBSQ HOSP IP/OBS MODERATE 35: CPT | Performed by: INTERNAL MEDICINE

## 2020-11-10 PROCEDURE — 0JBM0ZZ EXCISION OF LEFT UPPER LEG SUBCUTANEOUS TISSUE AND FASCIA, OPEN APPROACH: ICD-10-PCS | Performed by: SURGERY

## 2020-11-10 PROCEDURE — 87040 BLOOD CULTURE FOR BACTERIA: CPT

## 2020-11-10 PROCEDURE — 0JB70ZZ EXCISION OF BACK SUBCUTANEOUS TISSUE AND FASCIA, OPEN APPROACH: ICD-10-PCS | Performed by: SURGERY

## 2020-11-10 PROCEDURE — 74011000258 HC RX REV CODE- 258: Performed by: INTERNAL MEDICINE

## 2020-11-10 PROCEDURE — 74011250637 HC RX REV CODE- 250/637: Performed by: INTERNAL MEDICINE

## 2020-11-10 PROCEDURE — 74011000250 HC RX REV CODE- 250: Performed by: HOSPITALIST

## 2020-11-10 PROCEDURE — 82746 ASSAY OF FOLIC ACID SERUM: CPT

## 2020-11-10 PROCEDURE — 80053 COMPREHEN METABOLIC PANEL: CPT

## 2020-11-10 RX ADMIN — CEFEPIME 2 G: 2 INJECTION, POWDER, FOR SOLUTION INTRAVENOUS at 01:43

## 2020-11-10 RX ADMIN — DORZOLAMIDE HYDROCHLORIDE 1 DROP: 20 SOLUTION/ DROPS OPHTHALMIC at 22:37

## 2020-11-10 RX ADMIN — METRONIDAZOLE 500 MG: 500 INJECTION, SOLUTION INTRAVENOUS at 09:10

## 2020-11-10 RX ADMIN — VANCOMYCIN HYDROCHLORIDE 500 MG: 500 INJECTION, POWDER, LYOPHILIZED, FOR SOLUTION INTRAVENOUS at 00:14

## 2020-11-10 RX ADMIN — LATANOPROST 1 DROP: 50 SOLUTION OPHTHALMIC at 22:37

## 2020-11-10 RX ADMIN — DONEPEZIL HYDROCHLORIDE 10 MG: 5 TABLET, FILM COATED ORAL at 22:32

## 2020-11-10 RX ADMIN — BRIMONIDINE TARTRATE 1 DROP: 2 SOLUTION OPHTHALMIC at 16:50

## 2020-11-10 RX ADMIN — BRIMONIDINE TARTRATE 1 DROP: 2 SOLUTION OPHTHALMIC at 09:10

## 2020-11-10 RX ADMIN — CEFEPIME 2 G: 2 INJECTION, POWDER, FOR SOLUTION INTRAVENOUS at 13:57

## 2020-11-10 RX ADMIN — DORZOLAMIDE HYDROCHLORIDE 1 DROP: 20 SOLUTION/ DROPS OPHTHALMIC at 09:11

## 2020-11-10 RX ADMIN — FOLIC ACID: 5 INJECTION, SOLUTION INTRAMUSCULAR; INTRAVENOUS; SUBCUTANEOUS at 10:29

## 2020-11-10 RX ADMIN — BRIMONIDINE TARTRATE 1 DROP: 2 SOLUTION OPHTHALMIC at 22:37

## 2020-11-10 RX ADMIN — PHENYTOIN SODIUM 100 MG: 100 CAPSULE ORAL at 22:33

## 2020-11-10 RX ADMIN — Medication 10 ML: at 14:00

## 2020-11-10 RX ADMIN — METRONIDAZOLE 500 MG: 500 INJECTION, SOLUTION INTRAVENOUS at 22:37

## 2020-11-10 RX ADMIN — POTASSIUM CHLORIDE AND SODIUM CHLORIDE: 900; 150 INJECTION, SOLUTION INTRAVENOUS at 06:28

## 2020-11-10 NOTE — PROGRESS NOTES
Problem: Dysphagia (Adult)  Goal: *Acute Goals and Plan of Care (Insert Text)  Description: Swallowing goals initiated 11-9-20:  1) tolerate sips and purees in small amounts without s/s aspiration   Outcome: Not Progressing Towards Goal   SPEECH LANGUAGE PATHOLOGY DYSPHAGIA TREATMENT  Patient: Kayleigh Wong (22 y.o. female)  Date: 11/10/2020  Diagnosis: Sepsis ([address] [A41.9]  Decubitus ulcer of sacral region, stage 4 (HCC) [L89.154]  Cellulitis of multiple sites of buttock [L03.317]  Dehydration [E86.0]  Failure to thrive in adult [R62.7]  Leukocytosis [D72.829]   Decubitus ulcer of sacral region, stage 4 (HCC)       Precautions: aspiration Skin, DNR, Fall    ASSESSMENT:  Patient continues to have significant aspiration risk due to severity of oral dysphagia,  dependence for oral care and feeding. Family needs more training on same feeding. PLAN:  Recommendations and Planned Interventions:  Ok to continue dysphagia 1, nectars. Small bites and sips on tsp. Upright for all PO and 30 min after. Check oral cavity for residue before laying down. Patient continues to benefit from skilled intervention to address the above impairments. Continue treatment per established plan of care. Discharge Recommendations:  hospice     SUBJECTIVE:   Patient nonverbal today. Eyes open. Somewhat accepting of PO. .    OBJECTIVE:   Cognitive and Communication Status:  Neurologic State: Alert  Orientation Level: Unable to verbalize  Cognition: Decreased command following, Decreased attention/concentration  Perception: Appears intact  Perseveration: No perseveration noted  Safety/Judgement: Lack of insight into deficits  Dysphagia Treatment:  Oral Assessment:     P.O. Trials:  Patient Position: semi-upright in bed; SLP sat her up more.  Granddaughter present and educated on siting up for PO  Vocal quality prior to P.O.: Aphonic  Consistency Presented: Puree;Nectar thick liquid  How Presented: SLP-fed/presented;Spoon(family feeding)      ORAL PHASE:   Granddaughter feeding patient large bites with a little bit of a hurry. She wanted to get some food in patient before surgery tried to scrape wounds at bedside this pm  Patient with severe oral holding, oral residue, frontal leakage at times. 5 min to clear a bolus. SLP had to check oral cavity with a toothette b/c she would not open her mouth. Minimal residue found with toothette and ok for surgery to semi-recline for procedure. PHARYNGEAL PHASE:   Delay in swallow vs oral holding noted. No overt s/s aspiration, but has risks due to [place], dependence for oral care, severity of oral dysphagia. Granddaughter states they do not want PEG,and it can take between 10 min and 4 hours to eat a bowl of oatmeal.                                            Exercises:  Laryngeal Exercises:                                                                                                                                   Pain:  Pain Scale 1: Adult Nonverbal Pain Scale  Pain Intensity 1: 0       After treatment:   Patient left in no apparent distress in bed, Call bell within reach, and Nursing notified    COMMUNICATION/EDUCATION:   Patient was educated regarding her deficit(s) of dysphagia as this relates to her diagnosis of FTT. She demonstrated Guarded understanding as evidenced by lack of verbal interaction. .    The patient's plan of care including recommendations, planned interventions, and recommended diet changes were discussed with: Registered nurse and Physician.      DENIA Nogueira  Time Calculation: 15 mins

## 2020-11-10 NOTE — PROGRESS NOTES
[facility]  Pulmonary, Critical Care, and Sleep Medicine    Progress Note    Name: Cheyenne Kaba MRN: [id number]   : 1926 Hospital: [address] [place] [address]   Date: 11/10/2020        IMPRESSION:   · Sacral decubitus ulcer w/ concern for osteo  · L pleural effusion  · Failure to thrive  · Ams, h/o dementia  · Dysphagia  · H/o CVA  · H/o SSS s/p PPM  · H/o seizures      RECOMMENDATIONS:   · Supplemental O2 as needed to keep sats > 90% - remains on RA with sats %  · abx as per ID  · Goodybag  · Pureed diet with granddaughter to feed  · Discussed thoracentesis with granddaughter. Plans noted to speak with home hospice today. Discussed that as she is not hypoxic or dyspneic/in respiratory distress, will hold off on thoracentesis at this time. Can reconsider thora at a later time if she becomes symptomatic, the effusion is felt to be infected and/or the family does not plan to transition to hospice. Patient is stable from a pulmonary standpoint. We will sign off and arrange for outpatient pulmonary follow up via telemedicine in 1-2 weeks. Please call with questions. Subjective:     Ms. Elaina Bishop is a 79yo male w/ history of dementia, CVA, SSS s/p PPM, seizures and HTN who presented to the ER on  for evaluation of sarcral wounds w/ concern for infection. CT abd/pelvis showed sacral decubitus wounds that extend to the pubic ramus. It also shows a L sided pleural effusion. She does not appear to have any difficulty breathing currently and is not requiring any supplemental O2. Speech has seen the patient today: per their notes she does not have a functional swallow for meds and her swallow is not efficient enough for PO.     Interval history  Afebrile  HR 80s  Sats 97% on RA  Hgb 7.7 - stable  Na 148   blood cx no growth x 2 days   blood cx prelim with GRN in 1/2 bottles   wound cx prelim with light probable pseudomonas    ROS: Unable to obtain from patient due to underlying dementia/ams. Past Medical History:   Diagnosis Date    Dizziness     Essential hypertension     Glaucoma     Hx of completed stroke     NSVT (nonsustained ventricular tachycardia) ([facility] 7/10/2012    Pacemaker     [facility]     Sick sinus syndrome ([facility]       Past Surgical History:   Procedure Laterality Date    HX PACEMAKER        Prior to Admission medications    Medication Sig Start Date End Date Taking? Authorizing Provider   phenytoin ER (DILANTIN ER) 100 mg ER capsule Take 200 mg by mouth daily. Phenytoin 200 mg AM and 100 mg PM   Yes Provider, Historical   phenytoin ER (DILANTIN ER) 100 mg ER capsule Take 100 mg by mouth nightly. Phenytoin 200 mg AM and 100 mg PM   Yes Provider, Historical   simvastatin (ZOCOR) 20 mg tablet Take 20 mg by mouth nightly. Yes Provider, Historical   OTHER Patient crushes medications. She opens phenytoin capsules and takes this with apple sauce. Yes Provider, Historical   aspirin 81 mg chewable tablet Take 81 mg by mouth daily. Yes Provider, Historical   latanoprost (XALATAN) 0.005 % ophthalmic solution Administer 1 Drop to both eyes nightly. Yes Provider, Historical   brimonidine (ALPHAGAN) 0.2 % ophthalmic solution Administer 1 Drop to both eyes three (3) times daily. Yes Provider, Historical   calcium carbonate-vitamin D3 600 mg(1,500mg) -800 unit tab Take 1 Tab by mouth every evening. Yes Provider, Historical   cyanocobalamin, vitamin B-12, 5,000 mcg TbDi Take 1 Tab by mouth daily. Yes Provider, Historical   cholecalciferol (VITAMIN D3) (5000 Units/125 mcg) tab tablet Take 5,000 Units by mouth daily. Yes Provider, Historical   senna-docusate (Senna-S) 8.6-50 mg per tablet Take 1 Tab by mouth every evening. Yes Provider, Historical   dorzolamide (TRUSOPT) 2 % ophthalmic solution Administer 1 Drop to both eyes two (2) times a day.      Yes Provider, Historical   donepezil (ARICEPT) 10 mg tablet Take 10 mg by mouth nightly. Yes Provider, Historical     No Known Allergies   Social History     Tobacco Use    Smoking status: Never Smoker    Smokeless tobacco: Never Used   Substance Use Topics    Alcohol use: No      No family history on file. Current Facility-Administered Medications   Medication Dose Route Frequency    dextrose 5 % - 0.45% NaCl 1,000 mL with mvi, adult no. 4 with vit K 10 mL, thiamine 285 mg, folic acid 1 mg, potassium chloride 20 mEq infusion   IntraVENous QPM    [START ON 2020] Vancomycin trough  prior to 0000 dose   Other ONCE    sodium chloride (NS) flush 5-40 mL  5-40 mL IntraVENous Q8H    cefepime (MAXIPIME) 2 g in sterile water (preservative free) 10 mL IV syringe  2 g IntraVENous Q12H    latanoprost (XALATAN) 0.005 % ophthalmic solution 1 Drop  1 Drop Both Eyes QHS    brimonidine (ALPHAGAN) 0.2 % ophthalmic solution 1 Drop  1 Drop Both Eyes TID    donepeziL (ARICEPT) tablet 10 mg  10 mg Oral QHS    dorzolamide (TRUSOPT) 2 % ophthalmic solution 1 Drop  1 Drop Both Eyes BID    famotidine (PEPCID) tablet 20 mg  20 mg Oral DAILY    vancomycin (VANCOCIN) 500 mg in 0.9% sodium chloride (MBP/ADV) 100 mL  500 mg IntraVENous Q24H    metroNIDAZOLE (FLAGYL) IVPB premix 500 mg  500 mg IntraVENous Q12H    phenytoin ER (DILANTIN ER) ER capsule 200 mg  200 mg Oral DAILY    phenytoin ER (DILANTIN ER) ER capsule 100 mg  100 mg Oral QHS       Review of Systems:  Review of systems not obtained due to patient factors.     Objective:   Vital Signs:    Visit Vitals  BP (!) 119/58 (BP 1 Location: Left arm, BP Patient Position: At rest) Comment: RN notified    Pulse 85   Temp 98.3 °F (36.8 °C)   Resp 17   Ht 5' 3\" (1.6 m)   Wt 41.2 kg (90 lb 12.8 oz)   SpO2 97%   BMI 16.08 kg/m²       O2 Device: Room air       Temp (24hrs), Av.7 °F (36.5 °C), Min:97.3 °F (36.3 °C), Max:98.3 °F (36.8 °C)       Intake/Output:   Last shift:      11/10 07 - 11/10 1900  In: -   Out: 480 [Urine:480]  Last 3 shifts: 11/08 1901 - 11/10 0700  In: 1276.7 [I.V.:1276.7]  Out: 600 [Urine:600]    Intake/Output Summary (Last 24 hours) at 11/10/2020 1141  Last data filed at 11/10/2020 0831  Gross per 24 hour   Intake 1276.67 ml   Output 1080 ml   Net 196.67 ml      Physical Exam:   General:  Cachectic, awake and alert, NAD   Head:  NCAT   Eyes:  Sclera anicteric   Nose: Nares normal, no flaring   Throat: MMM - granddaughter feeding patient   Neck: Trachea midline   Lungs:   Diminished breath sounds, CTA, resp nonlabored   Chest wall:  Normal shape, nontender   Heart:  RRR   Abdomen:   Soft, NT, +bs   Extremities: No edema   Pulses: 2+   Skin: Decreased skin turgor with decubitus elcers   Lymph nodes: No cervical, supraclavicular lymphadenopathy   Neurologic: Not answering questions, h/o dementia     Data review:     Recent Results (from the past 24 hour(s))   METABOLIC PANEL, COMPREHENSIVE    Collection Time: 11/10/20  4:30 AM   Result Value Ref Range    Sodium 148 (H) 136 - 145 mmol/L    Potassium 4.0 3.5 - 5.1 mmol/L    Chloride 121 (H) 97 - 108 mmol/L    CO2 21 21 - 32 mmol/L    Anion gap 6 5 - 15 mmol/L    Glucose 68 65 - 100 mg/dL    BUN 22 (H) 6 - 20 MG/DL    Creatinine 0.62 0.55 - 1.02 MG/DL    BUN/Creatinine ratio 35 (H) 12 - 20      GFR est AA >60 >60 ml/min/1.73m2    GFR est non-AA >60 >60 ml/min/1.73m2    Calcium 8.2 (L) 8.5 - 10.1 MG/DL    Bilirubin, total 0.3 0.2 - 1.0 MG/DL    ALT (SGPT) 20 12 - 78 U/L    AST (SGOT) 31 15 - 37 U/L    Alk.  phosphatase 64 45 - 117 U/L    Protein, total 7.2 6.4 - 8.2 g/dL    Albumin 1.4 (L) 3.5 - 5.0 g/dL    Globulin 5.8 (H) 2.0 - 4.0 g/dL    A-G Ratio 0.2 (L) 1.1 - 2.2     CBC WITH AUTOMATED DIFF    Collection Time: 11/10/20  4:30 AM   Result Value Ref Range    WBC 10.2 3.6 - 11.0 K/uL    RBC 2.59 (L) 3.80 - 5.20 M/uL    HGB 7.7 (L) 11.5 - 16.0 g/dL    HCT 25.9 (L) 35.0 - 47.0 %    .0 (H) 80.0 - 99.0 FL    MCH 29.7 26.0 - 34.0 PG    MCHC 29.7 (L) 30.0 - 36.5 g/dL    RDW 17.4 (H) 11.5 - 14.5 %    PLATELET 469 168 - 477 K/uL    MPV 11.0 8.9 - 12.9 FL    NRBC 0.0 0  WBC    ABSOLUTE NRBC 0.00 0.00 - 0.01 K/uL    NEUTROPHILS 77 (H) 32 - 75 %    LYMPHOCYTES 12 12 - 49 %    MONOCYTES 9 5 - 13 %    EOSINOPHILS 1 0 - 7 %    BASOPHILS 1 0 - 1 %    IMMATURE GRANULOCYTES 1 (H) 0.0 - 0.5 %    ABS. NEUTROPHILS 7.9 1.8 - 8.0 K/UL    ABS. LYMPHOCYTES 1.2 0.8 - 3.5 K/UL    ABS. MONOCYTES 0.9 0.0 - 1.0 K/UL    ABS. EOSINOPHILS 0.1 0.0 - 0.4 K/UL    ABS. BASOPHILS 0.1 0.0 - 0.1 K/UL    ABS. IMM. GRANS. 0.1 (H) 0.00 - 0.04 K/UL    DF AUTOMATED     RETICULOCYTE COUNT    Collection Time: 11/10/20 10:48 AM   Result Value Ref Range    Reticulocyte count 1.9 0.7 - 2.1 %    Absolute Retic Cnt. 0.0488 0.0164 - 0.0776 M/ul       Imaging:  I have personally reviewed the patients radiographs and have reviewed the reports:  No new images this morning.         Julia Phillips

## 2020-11-10 NOTE — ROUTINE PROCESS
Bedside and Verbal shift change report given to Faiza DREA Barahona (oncoming nurse) by Prem Mendenhall RN (offgoing nurse). Report included the following information SBAR, Kardex, Intake/Output, MAR, Accordion and Cardiac Rhythm NSR.

## 2020-11-10 NOTE — ROUTINE PROCESS
Spoke with granddaughter. They Do not want a feeding tube. Stated that feeding can take hours sometimes and other times 10 minutes. They use thick liquids at home. SLP started dysphagia 1, nectars. Suspect PO intake will be insufficent for nutritional needs.

## 2020-11-10 NOTE — PROGRESS NOTES
Reason for Admission:   Sepsis, Decubitus Ulcer Sacral region Stage 4                  RUR Score:     18%             Do you (patient/family) have any concerns for transition/discharge? APS in [place] have been called. Plan for utilizing home health:   Granddaughter would like [facility] would care    Current Advanced Directive/Advance Care Plan:  DNR, No ACP            Transition of Care Plan:   I spoke with the granddaughter over the phone regarding the patient. She stated the patient lives in a 1 story home with her and her . Ramp in the front of the house. No prior home health. Patient has the following DME WC, RW and shower chair. She only uses WC. Patient is total assist and family gives her bed baths. Patient does not drive. The patient has 2 private aides through [facility] that comes 2 days a week on M and W for 8 hours a day. Family cares for the patient on the other days. Scripts @ [facility]. 10:51 AM  Update  I spoke with Khris Acosta with [place] [phone] and she stated that the Medicaid pay care givers was through [facility] which pays family or other non professional caregivers to care for the patient. The granddaughter did tell me that she gave me her work schedule which is M and W 8 hours and not the hours for the caregiver which is random times at 39 hours per week. Mitzi asked me to fax MD, nurse, wound care, forensics notes to fax number [phone]. CLARISSA Yousif      Plan:  1. Monitor patient's response to treatment. 2. Granddaughter will transport home  3. Granddaughter is not ready for pt. To be on hospice and would like to bring her home with [facility] wound care. Consult for hospice received. 4. Forensics has seen the patient. APS in [place] was called by Nursing. 5. Case Management to follow. Care Management Interventions  PCP Verified by CM: Yes(Dr. Nydia Rosario )  Mode of Transport at Discharge:  Other (see comment)(Family)  Transition of Care Consult (CM Consult): Discharge Planning  MyChart Signup: No  Discharge Durable Medical Equipment: No  Health Maintenance Reviewed: Yes  Physical Therapy Consult: No  Occupational Therapy Consult: No  Speech Therapy Consult: No  Current Support Network: Relative's Home  Confirm Follow Up Transport: Family  Discharge Location  Discharge Placement: Home with home health  [facility]

## 2020-11-10 NOTE — PROGRESS NOTES
[address] Dosing Services: Antimicrobial Stewardship Progress Note 11/10/2020    Consult for antibiotic dosing of Vancomycin by Dr. Candace Jon and Cefepime by renal protocol. Pharmacist reviewed antibiotic appropriateness for 80year old , female  for indication of multiple decubitus ulcers/sepsis  Day of Therapy 2    Plan:  Vancomycin therapy:  Start Vancomycin therapy, with loading dose of 1000 (mg) at 0129 11/09/20. Follow with maintenance dose of : by random level. Dose calculated to approximate a therapeutic trough of 15-20 mcg/mL. Last trough level / Plan for level: Patient with low CrCl ~30 ml/min due to age however Scr is at baseline and no apparent CARLY. Patient did receive a 1000 mg loading dose. Will start 500 mg Q24 hours due to low body weight. Calculated kinetics predict an initial trough 15.3 mcg/ml and  on this regimen. Trough ordered prior to 3rd total dose. Daily creatinine per protocol. Pharmacy to follow daily and will make changes to dose and/or frequency based on clinical status. Date Dose & Interval Measured (mcg/mL) Extrapolated (mcg/mL)   ?11/11 1000 mg x 1 then 500 mg Q24 hours? ? ?Css trough (prior to 3rd total dose)   ? ? ? ?   ? ? ? ? Non-Kinetic Antimicrobial Dosing:   Current Regimen:   Cefepime 2 grams IV q12h  Recommendation: Continue the same. CrCl 30-60 ml/min. Other Antimicrobial  (not dosed by pharmacist)   Metronidazole 500 mg Q12 hours   Cultures     11/8 Blood: 1/2 bottles flagged pos; Prelim  11/8 Wound: 4+ GNR; Prelim   Serum Creatinine     Lab Results   Component Value Date/Time    Creatinine 0.62 11/10/2020 04:30 AM       Creatinine Clearance Estimated Creatinine Clearance: 32.7 mL/min (by C-G formula based on SCr of 0.62 mg/dL).      Procalcitonin    Lab Results   Component Value Date/Time    Procalcitonin 0.15 11/09/2020 01:01 AM        Temp   97.6 °F (36.4 °C)    WBC   Lab Results   Component Value Date/Time    WBC 10.2 11/10/2020 04:30 AM H/H   Lab Results   Component Value Date/Time    HGB 7.7 (L) 11/10/2020 04:30 AM      Platelets Lab Results   Component Value Date/Time    PLATELET 555 05/97/7130 04:30 AM          Thanks,  Pharmacist: ALEKSEY ZhouD Contact information: [phone]

## 2020-11-10 NOTE — PROGRESS NOTES
[facility] Infectious Disease Progress Note          HPI:    Unable to obtain any history from the patient  Chart reviewed   Seen by pulm and palliative care   Bed side debridement per surgery  Notes       Physical exam   Vitals:   Patient Vitals for the past 24 hrs:   Temp Pulse Resp BP SpO2   11/10/20 1131 98.3 °F (36.8 °C)       11/10/20 1116 98.3 °F (36.8 °C) 85 17 (!) 119/58 97 %   11/10/20 0831     100 %   11/10/20 0741 97.6 °F (36.4 °C) 85 17 121/64 100 %   11/10/20 0700  91      11/10/20 0413 97.5 °F (36.4 °C) 91 17 (!) 119/57 92 %   11/09/20 2335 97.7 °F (36.5 °C) 87 17 121/79 94 %   11/09/20 2313  72      11/09/20 1959 97.3 °F (36.3 °C) 87 17 122/80 98 %   11/09/20 1515 97.3 °F (36.3 °C) 64 17 130/63 94 %     · GEN: NAD, cachectic  · HEENT:  no scleral icterus, unable to assess oral cavity  · CV: S1, S2 heard regularly and tachycardic  · Lungs: Clear to auscultation bilaterally anteriorly  · Abdomen: soft, non distended, no facial grimace to palpation  · Extremities: no edema, contracted extremities  · Neuro: Awake , does not follow commands, nonverbal   · skin: no rash        Labs:   Recent Results (from the past 24 hour(s))   METABOLIC PANEL, COMPREHENSIVE    Collection Time: 11/10/20  4:30 AM   Result Value Ref Range    Sodium 148 (H) 136 - 145 mmol/L    Potassium 4.0 3.5 - 5.1 mmol/L    Chloride 121 (H) 97 - 108 mmol/L    CO2 21 21 - 32 mmol/L    Anion gap 6 5 - 15 mmol/L    Glucose 68 65 - 100 mg/dL    BUN 22 (H) 6 - 20 MG/DL    Creatinine 0.62 0.55 - 1.02 MG/DL    BUN/Creatinine ratio 35 (H) 12 - 20      GFR est AA >60 >60 ml/min/1.73m2    GFR est non-AA >60 >60 ml/min/1.73m2    Calcium 8.2 (L) 8.5 - 10.1 MG/DL    Bilirubin, total 0.3 0.2 - 1.0 MG/DL    ALT (SGPT) 20 12 - 78 U/L    AST (SGOT) 31 15 - 37 U/L    Alk.  phosphatase 64 45 - 117 U/L    Protein, total 7.2 6.4 - 8.2 g/dL    Albumin 1.4 (L) 3.5 - 5.0 g/dL    Globulin 5.8 (H) 2.0 - 4.0 g/dL    A-G Ratio 0.2 (L) 1.1 - 2.2 CBC WITH AUTOMATED DIFF    Collection Time: 11/10/20  4:30 AM   Result Value Ref Range    WBC 10.2 3.6 - 11.0 K/uL    RBC 2.59 (L) 3.80 - 5.20 M/uL    HGB 7.7 (L) 11.5 - 16.0 g/dL    HCT 25.9 (L) 35.0 - 47.0 %    .0 (H) 80.0 - 99.0 FL    MCH 29.7 26.0 - 34.0 PG    MCHC 29.7 (L) 30.0 - 36.5 g/dL    RDW 17.4 (H) 11.5 - 14.5 %    PLATELET 637 710 - 609 K/uL    MPV 11.0 8.9 - 12.9 FL    NRBC 0.0 0  WBC    ABSOLUTE NRBC 0.00 0.00 - 0.01 K/uL    NEUTROPHILS 77 (H) 32 - 75 %    LYMPHOCYTES 12 12 - 49 %    MONOCYTES 9 5 - 13 %    EOSINOPHILS 1 0 - 7 %    BASOPHILS 1 0 - 1 %    IMMATURE GRANULOCYTES 1 (H) 0.0 - 0.5 %    ABS. NEUTROPHILS 7.9 1.8 - 8.0 K/UL    ABS. LYMPHOCYTES 1.2 0.8 - 3.5 K/UL    ABS. MONOCYTES 0.9 0.0 - 1.0 K/UL    ABS. EOSINOPHILS 0.1 0.0 - 0.4 K/UL    ABS. BASOPHILS 0.1 0.0 - 0.1 K/UL    ABS. IMM.  GRANS. 0.1 (H) 0.00 - 0.04 K/UL    DF AUTOMATED     HAPTOGLOBIN    Collection Time: 11/10/20 10:48 AM   Result Value Ref Range    Haptoglobin 275 (H) 30 - 200 mg/dL   IRON PROFILE    Collection Time: 11/10/20 10:48 AM   Result Value Ref Range    Iron 25 (L) 35 - 150 ug/dL    TIBC 118 (L) 250 - 450 ug/dL    Iron % saturation 21 20 - 50 %   RETICULOCYTE COUNT    Collection Time: 11/10/20 10:48 AM   Result Value Ref Range    Reticulocyte count 1.9 0.7 - 2.1 %    Absolute Retic Cnt. 0.0488 0.0164 - 0.0776 M/ul   LD    Collection Time: 11/10/20 10:48 AM   Result Value Ref Range     81 - 246 U/L   PHENYTOIN    Collection Time: 11/10/20 10:48 AM   Result Value Ref Range    Phenytoin 5.5 (L) 10.0 - 20.0 ug/mL       Microbiology Data:       Blood: 11/8   Component  Value  Flag  Ref Range  Units  Status    Special Requests:        Preliminary    NO SPECIAL REQUESTS    Culture result: Abnormal          Preliminary    GRAM NEGATIVE RODS GROWING IN 1 OF 2 BOTTLES DRAWN (SITE = R FEM)    Culture result:        Preliminary    REMAINING BOTTLE(S) HAS/HAVE NO GROWTH SO FAR        Wound cx   Specimen Information:  Sacral Wound          Component  Value  Flag  Ref Range  Units  Status    Special Requests:        Preliminary    NO SPECIAL REQUESTS    GRAM STAIN        Preliminary    OCCASIONAL WBCS SEEN    GRAM STAIN        Preliminary    4+ GRAM NEGATIVE RODS    Culture result: Abnormal          Preliminary    LIGHT PROBABLE PSEUDOMONAS SPECIES    Culture result:  LIGHT   MIXED ENTERIC DAVID                  Imaging:   Ct A/P  FINDINGS:   LOWER THORAX: Moderate to large left pleural effusion. Cardiomegaly. Cardiac  pacer. Left basilar atelectasis. LIVER/GALLBLADDER: Limited evaluation Gallbladder is within normal limits. Scatter artifact limits evaluation  SPLEEN/PANCREAS:  within normal limits. ADRENALS/KIDNEYS: Unremarkable. No calculus or hydronephrosis. STOMACH: Unremarkable. SMALL BOWEL/COLON: Fecal stasis. Extensive. APPENDIX: Not visualized  PERITONEUM: No ascites or pneumoperitoneum. RETROPERITONEUM: Aortic atherosclerotic change. No aneurysm. REPRODUCTIVE ORGANS: Unremarkable. URINARY BLADDER: No mass or calculus. BONES: Multiple compression deformities moderate to severe T11 T12 L1. Age-indeterminate. Multilevel severe foraminal stenoses of the lumbar spine. Sacral decubitus ulcer is noted. Soft tissue defect extensive posterior aspect  of the pubic ramus on the left. Osteopenia. Chronic pubic rami deformities on  the right and on the left. ADDITIONAL COMMENTS: N/A     IMPRESSION  IMPRESSION:  Sacral decubitus remote wound soft tissue defect that extends to the margin of  the inferior pubic ramus on the left.        Cardiomegaly with moderate left pleural effusion and left basilar atelectasis. Assessment / Plan:     Ms Vonda Mcgraw is a 70-year-old lady with a history of seizures, stroke status post cardiac device/pacemaker, sick sinus syndrome who was brought to the hospital for concerns for infected sacral decub ulcer/osteomyelitis.         1) sacral decubitus ulcer, likely osteomyelitis with exposed bone and GNR bacteremia , L pleural effusion   Currently on Vancomycin, cefepime and flagyl pending cultures  Surgery evaluated  Patient and  notes indicate bed side debridement today      Antibiotics alone without good nutrition, offloading pressure and wound care usually not effective and antibiotics usually not curative in such type situations  I am  concerned about antibiotic associated adverse effects given her age and inability to to express her symptoms or side effects if she experiences any, risk for C. difficile and lowering seizure threshold with broad antibiotics over long course  Noted surgery has been consulted  Discussed with granddaughter yesterday , that in such difficult scenarios, we could treat her as and  when she has superimposed infection for short course of antibiotics and then monitor versus long-term 6 weeks of IV antibiotics that would likely not be curative in her situation. Her grand daughter favors just treating intermittently rather than long-term antibiotic courses    Now has bacteremia as well and awaiting cultures, will at minimum treat with 2 weeks antibiotics if plans for aggressive care for bacteremia  Check TTE if aggressive goals of care     Noted palliative consulted and discussions for hospice per primary team     L pleural effusion and seen by pulm, holding off on thoracentesis for now       Monitor renal function closely  Antibiotic side effects/adverse effects/toxicities discussed including gastrointestinal, renal, hematological , ability to lower siezure threshold, risk for C diff infection. Probiotics, daily yogurt encouraged. 2) history of seizures    3) History of stroke     4) Hx sick sinus syndrome status post pacer  Await blood cultures  If blood cultures positive, will need further work-up based on goals of care    5) DVT px         Thank for the opportunity to participate in the care of this patient. Please contact with questions or concerns. Stephanie Ma DO  3:07 PM

## 2020-11-10 NOTE — FORENSIC NURSE
FNE responded to see patient. Photographs obtained. FNE undressed wounds but did not unpack dressing. FNE replaced dry dressing and tape after evaluation. SBAR given to Magy Chambers RN. FNE requested call back from [address] [place] [address]. 0930- FNE received call back from [facility]. FNE updated [facility]PS on patient status. TENZIN Cohen information provided to [facility]PS, Avril Romo.

## 2020-11-10 NOTE — PROGRESS NOTES
Bedside and Verbal shift change report given to Jacki Santiago rn  (oncoming nurse) by Beatriz Hallman  (offgoing nurse). Report included the following information SBAR and Kardex.

## 2020-11-10 NOTE — PROGRESS NOTES
[facility]    Medical Progress Note      NAME: Michell Severe   :  1926  MRM:  [id number]    Date/Time of service 11/10/2020  9:25 AM          Assessment and Plan:     Decubitus ulcer of sacral region, stage 4 / Cellulitis of multiple sites of buttock - POA, due to immobility and malnutrition. Wound, ID and surgery consulted. Follow cx. Surgery may do bedside debridement. Unlikely to heal.  For now cefepime, vanco and flagyl. Palliative consulted for hospice at home at discharge, vs LTC hospice. Sepsis / Leukocytosis / Sinus tachycardia - POA due to ulcer. GNR so far on wound cx, a blood cx was tagged. Abx as above    Acute metabolic encephalopathy / Hx of completed stroke / Dementia - POA, unclear baseline, but severe dementia on interview. She does not have capacity. In setting of other issues, hospice will be the only reasonable discharge option. Granddaughter Perri Crane agrees and is willing to speak with hospice about DC to Home Hosp[facility]. She will no longer benefit from ASA and simvastatin for CVA protection. Continue donepezil and phenytoin, check level. Sick sinus syndrome / PAT (paroxysmal atrial tachycardia) / Pacemaker - POA, stable. Not on rate control and not on anticoagulation. Severe protein-calorie malnutrition / Failure to thrive in adult / Hypoalbuminemia - POA, likely poor PO intake due to dementia, exacerbated by sepsis. Anemia - POA, due to chronic disease and sepsis, and will worsen with hydration. Monitor and transfuse as needed. Check serologies. Pleural effusion / Pulmonary hypertension - POA, effusion worsening per radiology report. Unclear etiology. Cannot diurese in setting of dehydration. Pulmonary consult. Glaucoma - Continue trusopt, and brimonidine drops    GERD - Pepcid    Essential hypertension, benign - Hold norvasc and lisinopril    Dehydration - hydrate and follow.          Chief Complaint:  Cannot obtain due to dementia    ROS:  (bold if positive, if negative)    Not Tolerating PT  Not Tolerating Diet        Objective:     Last 24hrs VS reviewed since prior progress note.  Most recent are:    Visit Vitals  /64 (BP 1 Location: Left arm, BP Patient Position: At rest)   Pulse 85   Temp 97.6 °F (36.4 °C)   Resp 17   Ht 5' 3\" (1.6 m)   Wt 41.2 kg (90 lb 12.8 oz)   SpO2 100%   BMI 16.08 kg/m²     SpO2 Readings from Last 6 Encounters:   11/10/20 100%   03/09/17 100%            Intake/Output Summary (Last 24 hours) at 11/10/2020 0933  Last data filed at 11/10/2020 0831  Gross per 24 hour   Intake 1276.67 ml   Output 840 ml   Net 436.67 ml        Physical Exam:    Gen:  Frail, cachectic, in no acute distress  HEENT:  Pink conjunctivae, PERRL, hearing intact to voice, drt mucous membranes  Neck:  Supple, without masses, thyroid non-tender  Resp:  No accessory muscle use, clear breath sounds without wheezes rales or rhonchi  Card:  No murmurs, normal S1, S2 without thrills, bruits or peripheral edema  Abd:  Soft, non-tender, non-distended, normoactive bowel sounds are present, no mass  Lymph:  No cervical or inguinal adenopathy  Musc:  No cyanosis or clubbing  Skin:  decubitus ulcers, skin turgor is poor   Neuro:  Cranial nerves are grossly intact, general motor weakness, does not follow commands appropriately  Psych:  Poor  Insight, not oriented    Telemetry reviewed:   normal sinus rhythm  __________________________________________________________________  Medications Reviewed: (see below)  Medications:     Current Facility-Administered Medications   Medication Dose Route Frequency    sodium chloride (NS) flush 5-40 mL  5-40 mL IntraVENous Q8H    sodium chloride (NS) flush 5-40 mL  5-40 mL IntraVENous PRN    acetaminophen (TYLENOL) tablet 650 mg  650 mg Oral Q6H PRN    Or    acetaminophen (TYLENOL) suppository 650 mg  650 mg Rectal Q6H PRN    polyethylene glycol (MIRALAX) packet 17 g  17 g Oral DAILY PRN    0.9% sodium chloride with KCl 20 mEq/L infusion   IntraVENous CONTINUOUS    cefepime (MAXIPIME) 2 g in sterile water (preservative free) 10 mL IV syringe  2 g IntraVENous Q12H    latanoprost (XALATAN) 0.005 % ophthalmic solution 1 Drop  1 Drop Both Eyes QHS    brimonidine (ALPHAGAN) 0.2 % ophthalmic solution 1 Drop  1 Drop Both Eyes TID    donepeziL (ARICEPT) tablet 10 mg  10 mg Oral QHS    dorzolamide (TRUSOPT) 2 % ophthalmic solution 1 Drop  1 Drop Both Eyes BID    famotidine (PEPCID) tablet 20 mg  20 mg Oral DAILY    vancomycin (VANCOCIN) 500 mg in 0.9% sodium chloride (MBP/ADV) 100 mL  500 mg IntraVENous Q24H    metroNIDAZOLE (FLAGYL) IVPB premix 500 mg  500 mg IntraVENous Q12H    morphine injection 1 mg  1 mg IntraVENous Q4H PRN    phenytoin ER (DILANTIN ER) ER capsule 200 mg  200 mg Oral DAILY    phenytoin ER (DILANTIN ER) ER capsule 100 mg  100 mg Oral QHS    sodium chloride (NS) flush 5-10 mL  5-10 mL IntraVENous PRN        Lab Data Reviewed: (see below)  Lab Review:     Recent Labs     11/10/20  0430 11/09/20  0101   WBC 10.2 18.3*   HGB 7.7* 7.7*   HCT 25.9* 24.7*    359     Recent Labs     11/10/20  0430 11/09/20  0101   * 145  143   K 4.0 3.7  3.8   * 118*  112*   CO2 21 22  26   GLU 68 102*  102*   BUN 22* 25*  26*   CREA 0.62 0.72  0.81   CA 8.2* 8.3*  8.9   ALB 1.4* 1.5*  1.7*   TBILI 0.3 0.3  0.3   ALT 20 23  28     No results found for: GLUCPOC  No results for input(s): PH, PCO2, PO2, HCO3, FIO2 in the last 72 hours. No results for input(s): INR, INREXT, INREXT in the last 72 hours.   All Micro Results     Procedure Component Value Units Date/Time    CULTURE, BLOOD [[id number]] Collected:  11/08/20 2011    Order Status:  Completed Specimen:  Blood Updated:  11/10/20 5462     Special Requests: NO SPECIAL REQUESTS        Culture result: NO GROWTH 2 DAYS       MICRO TRACKING [[id number]] Collected:  11/09/20 0101    Order Status:  Completed Updated:  11/10/20 7860 CULTURE, BLOOD [[id number]] Collected:  11/09/20 0101    Order Status:  Completed Specimen:  Blood Updated:  11/10/20 0304     Special Requests: NO SPECIAL REQUESTS        Culture result:       ONE OF TWO BOTTLES HAS BEEN FLAGGED POSITIVE BY INSTRUMENT. BOTTLE HAS BEEN SENT TO [facility] TO ASSESS FOR POSSIBLE GROWTH. REMAINING BOTTLE(S) HAS/HAVE NO GROWTH SO FAR          CULTURE, Nestor Hodgson [[id number]] Collected:  11/09/20 0101    Order Status:  Completed Specimen:  Sacral Wound Updated:  11/09/20 1408     Special Requests: NO SPECIAL REQUESTS        GRAM STAIN OCCASIONAL WBCS SEEN         4+ GRAM NEGATIVE RODS        Culture result: PENDING    URINE CULTURE HOLD SAMPLE [[id number]]     Order Status:  Sent Specimen:  Urine           Other pertinent lab: none    Total time spent with patient: 39 Minutes,I personally rounded  from 9 AM to 9:45 AM , in addition to the time spent by my partner, Dr Rubio Garcia, earlier today. I personally reviewed chart, notes, data and current medications in the medical record. I have personally examined and treated the patient at bedside during this period. I personally made additional diagnoses, placed additional orders and had additional discussions.                  Care Plan discussed with: Patient, Nursing Staff, Consultant/Specialist and >50% of time spent in counseling and coordination of care    Discussed:  Care Plan and D/C Planning    Prophylaxis:  H2B/PPI    Disposition:  hospice           ___________________________________________________    Attending Physician: Kunal Gómez MD

## 2020-11-10 NOTE — CONSULTS
Palliative Medicine Consult  [place]: [phone] ([phone])    Patient Name: Branden Flores  YOB: 1926    Date of Initial Consult: 11/10/20  Reason for Consult: End stage disease  Requesting Provider: Dr. Anna Souza  Primary Care Physician: Krista Powell MD     SUMMARY:   Branden Flores is a 80 y.o. 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 Current medical issues leading to Palliative Medicine involvement include: Advanced age, chronic debility, poor prognosis. Chart reviewed. Patient has been seen by wound care, general surgery, speech-language therapist, pulmonary and infectious disease. As of today she is now growing gram-negative rods in one of her peripheral blood cultures and light Pseudomonas in her wound culture. She is on IV antibiotics. She received a minimal amount of bedside debridement debridement today with Dr. Juli Cooley. There is no plan or need for further surgical debridement at this time. Wound care note 11/9-  1. POA left buttocks=4x6x.3cm   Wound bed slough and eschar. Drainage serous . Moderate amount Carmen wound reddened    2. POA Sacral stage 4 pressure injury bone present , slough and eschar foul smelling  8x4x2  copious amount of serous sanguinous  Carmen wound- reddened. 3. POA left Trochanter with eschar and slough , foul smelling. Moderate serous sanguinous drainage =5.5x2x.3cm   Carmen wound intact. 4. POA Left middle lateral back wound slough  5.5X4X0.6cm  Tunneling at 12:00 -0.9cm moderate serous sanguinous foul odor. Psychosocial historypatient's granddaughter has accompanied patient to medical appointments and seems to have had some care of patient dating back to at least 2010.   Patient resides with granddaughter and her  according to CM notes and has required total assist at home. PALLIATIVE DIAGNOSES:   1. Cachexia  2. Hypoalbuminemia   3. Advanced dementia vs late effects of CVA  4. Multiple pressures ulcer including stage IV sacral decubitus  5. Left pleural effusion  6. Cardiomegaly       PLAN:   1. Patient's granddaughter was reportedly at the bedside this morning but unfortunately she left by the time I assessed. 2. I attempted to reach granddaughter by phone but was unsuccessful on both home and cell phone lines and was unable to leave a message. 3. Patient was nonverbal for me today and did not track. 4. She did not appear physically uncomfortable on exam today, but per bedside RN team she does experience discomfort as evidenced by grimacing and light moaning with wound care. 5. While I do not have a lot of information about Ms. Mckeon's most recent trajectory, her nutritional status is certainly very poor and her prognosis for recovery in the setting of advanced dementia, what appears to be bedbound status and severe protein calorie malnutrition is obviously quite poor. 6. Medical recommendation would be to consider hospice support at home to best support appropriate symptom management including local wound care and pain management. Ongoing life sustaining approach would include weeks of IV abx and consideration of further diagnostic studies and procedures (she has a pacemaker and is now bacteremic and a pleural effusion) which I doubt she would tolerate well and would not be expected to add to her longevity or quality of life. 7. I will make another attempt to contact granddaughter by end of day and/or tomorrow. 8. Thank you for the opportunity to participate in the care of Benita Shepard  9. Communicated plan of care with: Palliative Juli G[facility] Team     GOALS OF CARE / TREATMENT PREFERENCES:     GOALS OF CARE:  Patient/Health Care Proxy Stated Goals:  Other (comment)(unclear pending discussion with family)    TREATMENT PREFERENCES:   Code Status: DNR    Advance Care Planning:  [x] [facility] Interdisciplinary Team has updated the ACP Navigator with Health Care Decision Maker and Patient Capacity      Primary Decision Maker (Active): Coty Jose - [phone]    Per chart review- no other family listed aside from [facility]. She has self identified herself as \"POA\" over the years. No supporting documentation uploaded to date. Advance Care Planning 11/10/2020   Patient's Healthcare Decision Maker is: Legal Next of Kin   Confirm Advance Directive None   Patient Would Like to Complete Advance Directive Unable       Medical Interventions: Limited additional interventions(granddaughter made decision for DNR)   Artificially Administered Nutrition: No feeding tube(per SLP notes)     Other:    As far as possible, the palliative care team has discussed with patient / health care proxy about goals of care / treatment preferences for patient.      HISTORY:     History obtained from:  Chart review    CHIEF COMPLAINT: pt not able to report- brought in by granddaughter due to foul smelling wounds, concern for infection    HPI/SUBJECTIVE:    The patient is:   [] Verbal and participatory  [x] Non-participatory due to:   Dementia, CVA history, minimally verbal.      Clinical Pain Assessment (nonverbal scale for severity on nonverbal patients):   Clinical Pain Assessment  Severity: 0     Activity (Movement): Lying quietly, normal position    Duration: for how long has pt been experiencing pain (e.g., 2 days, 1 month, years)  Frequency: how often pain is an issue (e.g., several times per day, once every few days, constant)     FUNCTIONAL ASSESSMENT:     Palliative Performance Scale (PPS):  PPS: 20       PSYCHOSOCIAL/SPIRITUAL SCREENING:     Palliative IDT has assessed this patient for cultural preferences / practices and a referral made as appropriate to needs [employer], Patient Advocacy, Ethics, etc.)    Any spiritual / Yazidism concerns:  [] Yes /  [x] No    Caregiver Burnout:  [] Yes /  [x] No /  [] No Caregiver Present      Anticipatory grief assessment:   [x] Normal  / [] Maladaptive       ESAS Anxiety: Anxiety: 0    ESAS Depression:          REVIEW OF SYSTEMS:     Positive and pertinent negative findings in ROS are noted above in HPI. The following systems were [x] reviewed / [] unable to be reviewed as noted in HPI  Other findings are noted below. Systems: constitutional, ears/nose/mouth/throat, respiratory, gastrointestinal, genitourinary, musculoskeletal, integumentary, neurologic, psychiatric, endocrine. Positive findings noted below. Modified ESAS Completed by: provider   Fatigue: 10 Drowsiness: 6     Pain: 0   Anxiety: 0 Nausea: 0   Anorexia: 10 Dyspnea: 0                    PHYSICAL EXAM:     From RN flowsheet:  Wt Readings from Last 3 Encounters:   11/10/20 90 lb 12.8 oz (41.2 kg)   03/09/17 120 lb (54.4 kg)   04/11/14 107 lb (48.5 kg)     Blood pressure (!) 119/58, pulse 91, temperature 98.3 °F (36.8 °C), resp. rate 17, height 5' 3\" (1.6 m), weight 90 lb 12.8 oz (41.2 kg), SpO2 97 %.     Pain Scale 1: Adult Nonverbal Pain Scale  Pain Intensity 1: 0  Pain Onset 1: chronic  Pain Location 1: Spine, sacral           Last bowel movement, if known:     Constitutional: cachectic elderly AA woman lying still in bed  Eyes: sclerae anicteric  ENMT: no nasal discharge, moist mucous membranes  Cardiovascular: regular rhythm, distal pulses intact  Respiratory: breathing not labored, on RA  Gastrointestinal: difficult to examine due to position  Musculoskeletal: left arm contracted  Skin: warm, dry, buttocks/sacrum bandage c/d/i  Neurologic: eyes open, no tracking or command following, no words spoken during my assessment  Psychiatric: flat affect       HISTORY:     Principal Problem:    Decubitus ulcer of sacral region, stage 4 ([facility] [address] (11/9/2020)    Active Problems:    Essential hypertension, benign (11/3/2010)      Sick sinus syndrome (HCC) ()      Overview: Echo: 10/14/2010 at [facility]      LVEF 65%, mild MR and TR, PAP 50      Pacemaker ()      Overview: Dual chamber [employer] Sensia DOI 10/2010      Pulmonary hypertension ([address] (11/5/2010)      PAT (paroxysmal atrial tachycardia) ([address] (1/15/2013)      Overview: 5.5 min noted on pacer check      Anemia (11/9/2020)      Dehydration (11/9/2020)      Leukocytosis (11/9/2020)      Sepsis ([address] (11/9/2020)      Failure to thrive in adult (11/9/2020)      Cellulitis of multiple sites of buttock (11/9/2020)      Acute metabolic encephalopathy (69/4/1015)      Severe protein-calorie malnutrition ([address] (11/9/2020)      Pleural effusion (11/9/2020)      Sinus tachycardia (11/9/2020)      Dementia ([address] (11/9/2020)      Seizure disorder (HCC) ()      Glaucoma ()      Hx of completed stroke ()      Past Medical History:   Diagnosis Date    Dizziness     Essential hypertension     Glaucoma     Hx of completed stroke     NSVT (nonsustained ventricular tachycardia) ([address] 7/10/2012    Pacemaker     Seizure disorder ([address]     Sick sinus syndrome ([address]       Past Surgical History:   Procedure Laterality Date    HX PACEMAKER        No family history on file. History reviewed, no pertinent family history. Social History     Tobacco Use    Smoking status: Never Smoker    Smokeless tobacco: Never Used   Substance Use Topics    Alcohol use: No     No Known Allergies   Current Facility-Administered Medications   Medication Dose Route Frequency    dextrose 5 % - 0.45% NaCl 1,000 mL with mvi, adult no. 4 with vit K 10 mL, thiamine 723 mg, folic acid 1 mg, potassium chloride 20 mEq infusion   IntraVENous QPM    [START ON 11/11/2020] Vancomycin trough 11/11 prior to 0000 dose   Other ONCE    sodium chloride (NS) flush 5-40 mL  5-40 mL IntraVENous Q8H    sodium chloride (NS) flush 5-40 mL  5-40 mL IntraVENous PRN    acetaminophen (TYLENOL) tablet 650 mg  650 mg Oral Q6H PRN    Or    acetaminophen (TYLENOL) suppository 650 mg  650 mg Rectal Q6H PRN    polyethylene glycol (MIRALAX) packet 17 g  17 g Oral DAILY PRN    cefepime (MAXIPIME) 2 g in sterile water (preservative free) 10 mL IV syringe  2 g IntraVENous Q12H    latanoprost (XALATAN) 0.005 % ophthalmic solution 1 Drop  1 Drop Both Eyes QHS    brimonidine (ALPHAGAN) 0.2 % ophthalmic solution 1 Drop  1 Drop Both Eyes TID    donepeziL (ARICEPT) tablet 10 mg  10 mg Oral QHS    dorzolamide (TRUSOPT) 2 % ophthalmic solution 1 Drop  1 Drop Both Eyes BID    famotidine (PEPCID) tablet 20 mg  20 mg Oral DAILY    vancomycin (VANCOCIN) 500 mg in 0.9% sodium chloride (MBP/ADV) 100 mL  500 mg IntraVENous Q24H    metroNIDAZOLE (FLAGYL) IVPB premix 500 mg  500 mg IntraVENous Q12H    morphine injection 1 mg  1 mg IntraVENous Q4H PRN    phenytoin ER (DILANTIN ER) ER capsule 200 mg  200 mg Oral DAILY    phenytoin ER (DILANTIN ER) ER capsule 100 mg  100 mg Oral QHS    sodium chloride (NS) flush 5-10 mL  5-10 mL IntraVENous PRN          LAB AND IMAGING FINDINGS:     Lab Results   Component Value Date/Time    WBC 10.2 11/10/2020 04:30 AM    HGB 7.7 (L) 11/10/2020 04:30 AM    PLATELET 493 72/70/2243 04:30 AM     Lab Results   Component Value Date/Time    Sodium 148 (H) 11/10/2020 04:30 AM    Potassium 4.0 11/10/2020 04:30 AM    Chloride 121 (H) 11/10/2020 04:30 AM    CO2 21 11/10/2020 04:30 AM    BUN 22 (H) 11/10/2020 04:30 AM    Creatinine 0.62 11/10/2020 04:30 AM    Calcium 8.2 (L) 11/10/2020 04:30 AM    Magnesium 2.1 07/10/2012 12:21 PM      Lab Results   Component Value Date/Time    Alk.  phosphatase 64 11/10/2020 04:30 AM    Protein, total 7.2 11/10/2020 04:30 AM    Albumin 1.4 (L) 11/10/2020 04:30 AM    Globulin 5.8 (H) 11/10/2020 04:30 AM     Lab Results   Component Value Date/Time    INR 1.1 03/09/2017 09:01 AM    Prothrombin time 10.6 03/09/2017 09:01 AM      Lab Results   Component Value Date/Time    Iron 25 (L) 11/10/2020 10:48 AM    TIBC 118 (L) 11/10/2020 10:48 AM    Iron % saturation 21 11/10/2020 10:48 AM      No results found for: PH, PCO2, PO2  No components found for: GLPOC   No results found for: CPK, CKMB             Total time:   Counseling / coordination time, spent as noted above:   > 50% counseling / coordination?:     Prolonged service was provided for  []30 min   []75 min in face to face time in the presence of the patient, spent as noted above. Time Start:   Time End:   Note: this can only be billed with 09509 (initial) or 56491 (follow up). If multiple start / stop times, list each separately.

## 2020-11-10 NOTE — PROGRESS NOTES
General Surgery Daily Progress Note    Patient: Erica Ho MRN: [id number]  SSN: [id number]    YOB: 1926  Age: 80 y.o. Sex: female      Admit Date: 11/8/2020    POD * No surgery found *    Procedure: * No surgery found *    Subjective:   Grandaughter at bedside  Feeding patient    Current Facility-Administered Medications   Medication Dose Route Frequency    dextrose 5 % - 0.45% NaCl 1,000 mL with mvi, adult no. 4 with vit K 10 mL, thiamine 851 mg, folic acid 1 mg, potassium chloride 20 mEq infusion   IntraVENous QPM    [START ON 11/11/2020] Vancomycin trough 11/11 prior to 0000 dose   Other ONCE    sodium chloride (NS) flush 5-40 mL  5-40 mL IntraVENous Q8H    sodium chloride (NS) flush 5-40 mL  5-40 mL IntraVENous PRN    acetaminophen (TYLENOL) tablet 650 mg  650 mg Oral Q6H PRN    Or    acetaminophen (TYLENOL) suppository 650 mg  650 mg Rectal Q6H PRN    polyethylene glycol (MIRALAX) packet 17 g  17 g Oral DAILY PRN    cefepime (MAXIPIME) 2 g in sterile water (preservative free) 10 mL IV syringe  2 g IntraVENous Q12H    latanoprost (XALATAN) 0.005 % ophthalmic solution 1 Drop  1 Drop Both Eyes QHS    brimonidine (ALPHAGAN) 0.2 % ophthalmic solution 1 Drop  1 Drop Both Eyes TID    donepeziL (ARICEPT) tablet 10 mg  10 mg Oral QHS    dorzolamide (TRUSOPT) 2 % ophthalmic solution 1 Drop  1 Drop Both Eyes BID    famotidine (PEPCID) tablet 20 mg  20 mg Oral DAILY    vancomycin (VANCOCIN) 500 mg in 0.9% sodium chloride (MBP/ADV) 100 mL  500 mg IntraVENous Q24H    metroNIDAZOLE (FLAGYL) IVPB premix 500 mg  500 mg IntraVENous Q12H    morphine injection 1 mg  1 mg IntraVENous Q4H PRN    phenytoin ER (DILANTIN ER) ER capsule 200 mg  200 mg Oral DAILY    phenytoin ER (DILANTIN ER) ER capsule 100 mg  100 mg Oral QHS    sodium chloride (NS) flush 5-10 mL  5-10 mL IntraVENous PRN        Objective:   11/10 0701 - 11/10 1900  In: -   Out: 480 [Urine:480]  11/08 1901 - 11/10 0700  In: 1276.7 [I.V.:1276.7]  Out: 600 [Urine:600]  Patient Vitals for the past 8 hrs:   BP Temp Pulse Resp SpO2   11/10/20 1131  98.3 °F (36.8 °C)      11/10/20 1116 (!) 119/58 98.3 °F (36.8 °C) 85 17 97 %   11/10/20 0831     100 %   11/10/20 0741 121/64 97.6 °F (36.4 °C) 85 17 100 %   11/10/20 0700   91         Physical Exam:  General: Alert, cooperative, NAD  Lungs: Unlabored  Skin:  Multiple decubes stage 4. Sacral and left buttock decube have some necrotic tissue which will debride    Labs:   Recent Labs     11/10/20  0430   WBC 10.2   HGB 7.7*   HCT 25.9*        Recent Labs     11/10/20  0430   *   K 4.0   *   CO2 21   GLU 68   BUN 22*   CREA 0.62   CA 8.2*   ALB 1.4*   TBILI 0.3   ALT 20       Assessment / Plan:     POD * No surgery found *    Procedure: * No surgery found *  Discussed debridement with POA  Bedside debridement today, minimal needed.     Otherwise continue wound care per wound management  No further surgical intervention        Adrianne Sim MD

## 2020-11-11 LAB
CREAT SERPL-MCNC: 0.61 MG/DL (ref 0.55–1.02)
DATE LAST DOSE: NORMAL
REPORTED DOSE,DOSE: NORMAL UNITS
REPORTED DOSE/TIME,TMG: NORMAL
VANCOMYCIN TROUGH SERPL-MCNC: 9 UG/ML (ref 5–10)

## 2020-11-11 PROCEDURE — 94760 N-INVAS EAR/PLS OXIMETRY 1: CPT

## 2020-11-11 PROCEDURE — 74011000250 HC RX REV CODE- 250: Performed by: HOSPITALIST

## 2020-11-11 PROCEDURE — 93288 INTERROG EVL PM/LDLS PM IP: CPT

## 2020-11-11 PROCEDURE — 36415 COLL VENOUS BLD VENIPUNCTURE: CPT

## 2020-11-11 PROCEDURE — 65660000000 HC RM CCU STEPDOWN

## 2020-11-11 PROCEDURE — 74011000258 HC RX REV CODE- 258: Performed by: HOSPITALIST

## 2020-11-11 PROCEDURE — 99233 SBSQ HOSP IP/OBS HIGH 50: CPT | Performed by: INTERNAL MEDICINE

## 2020-11-11 PROCEDURE — 4B02XSZ MEASUREMENT OF CARDIAC PACEMAKER, EXTERNAL APPROACH: ICD-10-PCS | Performed by: NURSE PRACTITIONER

## 2020-11-11 PROCEDURE — 74011250637 HC RX REV CODE- 250/637: Performed by: INTERNAL MEDICINE

## 2020-11-11 PROCEDURE — 74011000258 HC RX REV CODE- 258: Performed by: INTERNAL MEDICINE

## 2020-11-11 PROCEDURE — 74011250636 HC RX REV CODE- 250/636: Performed by: HOSPITALIST

## 2020-11-11 PROCEDURE — 74011000250 HC RX REV CODE- 250: Performed by: INTERNAL MEDICINE

## 2020-11-11 PROCEDURE — 82565 ASSAY OF CREATININE: CPT

## 2020-11-11 PROCEDURE — 74011250636 HC RX REV CODE- 250/636: Performed by: INTERNAL MEDICINE

## 2020-11-11 PROCEDURE — 80202 ASSAY OF VANCOMYCIN: CPT

## 2020-11-11 PROCEDURE — 92526 ORAL FUNCTION THERAPY: CPT

## 2020-11-11 RX ADMIN — DORZOLAMIDE HYDROCHLORIDE 1 DROP: 20 SOLUTION/ DROPS OPHTHALMIC at 09:04

## 2020-11-11 RX ADMIN — VANCOMYCIN HYDROCHLORIDE 500 MG: 500 INJECTION, POWDER, LYOPHILIZED, FOR SOLUTION INTRAVENOUS at 01:45

## 2020-11-11 RX ADMIN — BRIMONIDINE TARTRATE 1 DROP: 2 SOLUTION OPHTHALMIC at 09:04

## 2020-11-11 RX ADMIN — FOLIC ACID: 5 INJECTION, SOLUTION INTRAMUSCULAR; INTRAVENOUS; SUBCUTANEOUS at 18:12

## 2020-11-11 RX ADMIN — BRIMONIDINE TARTRATE 1 DROP: 2 SOLUTION OPHTHALMIC at 15:33

## 2020-11-11 RX ADMIN — DORZOLAMIDE HYDROCHLORIDE 1 DROP: 20 SOLUTION/ DROPS OPHTHALMIC at 21:34

## 2020-11-11 RX ADMIN — PHENYTOIN SODIUM 100 MG: 100 CAPSULE ORAL at 21:34

## 2020-11-11 RX ADMIN — CEFEPIME 2 G: 2 INJECTION, POWDER, FOR SOLUTION INTRAVENOUS at 12:41

## 2020-11-11 RX ADMIN — LATANOPROST 1 DROP: 50 SOLUTION OPHTHALMIC at 21:59

## 2020-11-11 RX ADMIN — METRONIDAZOLE 500 MG: 500 INJECTION, SOLUTION INTRAVENOUS at 09:03

## 2020-11-11 RX ADMIN — Medication 10 ML: at 14:46

## 2020-11-11 RX ADMIN — BRIMONIDINE TARTRATE 1 DROP: 2 SOLUTION OPHTHALMIC at 21:34

## 2020-11-11 RX ADMIN — CEFEPIME 2 G: 2 INJECTION, POWDER, FOR SOLUTION INTRAVENOUS at 01:45

## 2020-11-11 RX ADMIN — VANCOMYCIN HYDROCHLORIDE 750 MG: 750 INJECTION, POWDER, LYOPHILIZED, FOR SOLUTION INTRAVENOUS at 20:12

## 2020-11-11 RX ADMIN — METRONIDAZOLE 500 MG: 500 INJECTION, SOLUTION INTRAVENOUS at 21:34

## 2020-11-11 RX ADMIN — DONEPEZIL HYDROCHLORIDE 10 MG: 5 TABLET, FILM COATED ORAL at 21:34

## 2020-11-11 RX ADMIN — PHENYTOIN SODIUM 200 MG: 100 CAPSULE ORAL at 09:04

## 2020-11-11 RX ADMIN — Medication 10 ML: at 06:24

## 2020-11-11 RX ADMIN — FAMOTIDINE 20 MG: 20 TABLET, FILM COATED ORAL at 09:03

## 2020-11-11 NOTE — CONSULTS
Comprehensive Nutrition Assessment    Type and Reason for Visit: Reassess    Nutrition Recommendations/Plan:   1. Continue puree, nectar-thickened liquids diet or per SLP. 2. Will add Ensure Pudding to promote adequate protein intake. Nutrition Assessment:      11/11: F/u. Diet advanced. Per SLP, pt still eating/swallowing small amounts over an extended period. Per SLP note, pt ate 4 bites over a 20 minute period. Per RN, pt has eaten a few bites throughout the day but not much. Will add ensure Pudding for extra protein and monitor acceptance. Palliative following- per note, goal of care include \"return and remain at home, die peacefully in home setting. \" D/c order also noted for tomorrow. Will continue to monitor while here. Labs- Na 148. Meds- goodybag, cefepime. 11/9: 81 y/o female admitted with sepsis, decub ulcer. PMH includes HTN, GERD, hc CVA. BMI 17.5, c/w underweight. Pt not communicating, no family in room at time of visit. No recent weight hx in chart, CBW pt stated so unsure of accuracy. Pt with severe fat and muscle wasting and currently meets criteria for severe malnutrition based on assessment below. Currently NPO, SLP consulted. Will add supplements for multiple wounds once diet advanced. Noted that hospice is also consulted. Will monitor goals of care. Labs- reviewed. Meds- Protonix. Weight hx:   Wt Readings from Last 10 Encounters:   11/11/20 42 kg (92 lb 11.2 oz)   03/09/17 54.4 kg (120 lb)   04/11/14 48.5 kg (107 lb)   01/07/14 46.3 kg (102 lb)   01/15/13 49.4 kg (109 lb)   07/10/12 50.3 kg (111 lb)   11/29/11 48.1 kg (106 lb)   05/24/11 49.4 kg (108 lb 12.8 oz)   11/05/10 46.1 kg (101 lb 9.6 oz)     Malnutrition Assessment:  Malnutrition Status:  Severe malnutrition    Context:  Chronic illness     Findings of the 6 clinical characteristics of malnutrition:   Energy Intake:  Unable to assess  Weight Loss:  Unable to assess     Body Fat Loss:  7 - Severe body fat loss, Triceps, Fat overlying ribs   Muscle Mass Loss:  7 - Severe muscle mass loss, Temples (temporalis), Clavicles (pectoralis &deltoids)  Fluid Accumulation:  Unable to assess,     Strength:  Not performed     Estimated Daily Nutrient Needs:  Energy (kcal): 2308(347 x 1.3 AF (+250)); Weight Used for Energy Requirements: Current  Protein (g): 67(1.2-1.5 g/kg); Weight Used for Protein Requirements: Current  Fluid (ml/day): 1319; Method Used for Fluid Requirements: 1 ml/kcal    Nutrition Related Findings:  LBM unknown      Wounds:    Stage IV, Multiple(stage 4 sacral, buttocks, stage 2 hip)       Current Nutrition Therapies:  DIET DYSPHAGIA PUREED (NDD1)  2 Belt/2 Mildly Thick  DIET NUTRITIONAL SUPPLEMENTS Lunch, Dinner; Pudding, Fortified    Anthropometric Measures:  · Height:  5' 3\" (160 cm)  · Current Body Wt:  42 kg (92 lb 9.5 oz)   · Admission Body Wt:       · Usual Body Wt:        · Ideal Body Wt:  115 lbs:  86.1 %   · Adjusted Body Weight:   ; Weight Adjustment for: No adjustment    · BMI Category:  Underweight (BMI less than 18.5)       Nutrition Diagnosis:   · Inadequate oral intake related to inadequate protein-energy intake as evidenced by NPO or clear liquid status due to medical condition    11/11: Nutrition dx continues.     Nutrition Interventions:   Food and/or Nutrient Delivery: Continue current diet  Nutrition Education and Counseling: No recommendations at this time  Coordination of Nutrition Care: Continue to monitor while inpatient, Swallow evaluation    Goals:  PO intake >50% meals + ONS meeting protein needs next 1-3 days       Nutrition Monitoring and Evaluation:   Behavioral-Environmental Outcomes: None identified  Food/Nutrient Intake Outcomes: Food and nutrient intake, Diet advancement/tolerance, Supplement intake  Physical Signs/Symptoms Outcomes: Weight, Skin, Nutrition focused physical findings, Biochemical data    Discharge Planning:    Continue current diet, Continue oral nutrition supplement Electronically signed by aNtty Jade RDN on 11/11/2020    Contact: [phone]

## 2020-11-11 NOTE — CONSULTS
PPM interrogated: All device and lead parameters WNL.   96 atrial high episodes last on 11/9/2020  17 ventricular episodes last 6/18/2020  Presenting rhythm: A sensed, V paced   AP 24 %    0.2%

## 2020-11-11 NOTE — ADVANCED PRACTICE NURSE
[facility] RN will interrogate PPM and transmit to [employer]. Román Trent , [employer] rep aware.

## 2020-11-11 NOTE — PROGRESS NOTES
0145  0000 vancomycin dose started late due to issues with drawing pt's vanc trough. I attempted to stick her twice, other staff member on floor attempted twice, and finally PACU charge nurse came up to attempt twice and was successful on her second try. Called pharmacy to make them aware of delay in trough. Pharmacy said okay to go ahead and start vanc dose now. Vanc started right after trough sent to lab.

## 2020-11-11 NOTE — ACP (ADVANCE CARE PLANNING)
[facility]  Hospitalist Group                                      Advance Care Planning Note    Name: Mark Fair  YOB: 1926  MRN: [id number]  Admission Date: 11/8/2020  7:42 PM    Date of discussion: 11/11/2020    Active Diagnoses:    Hospital Problems  Date Reviewed: 4/11/2014           Anemia   Dehydration   Leukocytosis   Sepsis ([address]   Failure to thrive in adult   Cellulitis of multiple sites of buttock   * (Principal) Decubitus ulcer of sacral region, stage 4 (HCC)   Acute metabolic encephalopathy   Severe protein-calorie malnutrition (HCC)   Pleural effusion   Sinus tachycardia   Dementia (HCC)   Seizure disorder (HCC)   Glaucoma   Hx of completed stroke   PAT (paroxysmal atrial tachycardia) (HCC)   Sick sinus syndrome ([address]   Pacemaker   Pulmonary hypertension ([address]   Essential hypertension, benign          These active diagnoses are of sufficient risk that focused discussion on advance care planning is indicated in order to allow the patient to thoughtfully consider personal goals of care, and if situations arise that prevent the ability to personally give input, to ensure appropriate representation of their personal desires for different levels and aggressiveness of care.      Discussion:     Persons present and participating in discussion: Sarah Dolan MD, Granddaughter Devonia    Topics Discussed:  Patient's medical condition and diagnosis: [x ] yes [  ] no   Surrogate decision maker: x ] yes [  ] no   Patient's current physical function/cognitive function/frailty: [x] yes [  ] no   Code Status: [ x] yes [  ] no   Artificial Nutrition / Dialysis / Non-Invasive Ventilation / Blood Transfusion: [ x] yes [  ] no  Potential Resources for home (durable medical equipment, home nursing, home O2): [x] yes [  ] no    Overview of Discussion: Stage 4 decubitus ulcer treatment and prognosis in setting of end stage dementia, malnutrition, hx CVA.  Granddaughter is MPOA. Patient remains DNR. I discussed medical prognosis of no potential for recovery. We advised hospice. Patient and family do not want to return to hospice. Family accepted plan to have hospice ready to enroll at home as soon as patient worsens, which I anticipate within days of discharge. Time Spent:     Total time spent face-to-face in education and discussion: 16 minutes.      Javier Queen MD  Date of Service:  11/11/2020  11:41 AM

## 2020-11-11 NOTE — CONSULTS
Palliative Medicine Consult  [place]: [phone] ([phone])    Patient Name: Erin Tejada  YOB: 1926    Date of Initial Consult: 11/10/20  Reason for Consult: End stage disease  Requesting Provider: Dr. Alfonso Villalba  Primary Care Physician: Dolores Beck MD     SUMMARY:   Erin Tejada is a 80 y.o. 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 Current medical issues leading to Palliative Medicine involvement include: Advanced age, chronic debility, poor prognosis. Chart reviewed. Patient has been seen by wound care, general surgery, speech-language therapist, pulmonary and infectious disease. As of today she is now growing gram-negative rods in one of her peripheral blood cultures and light Pseudomonas in her wound culture. She is on IV antibiotics. She received a minimal amount of bedside debridement debridement today with Dr. Lorin Hernandez. There is no plan or need for further surgical debridement at this time. Wound care note 11/9-  1. POA left buttocks=4x6x.3cm   Wound bed slough and eschar. Drainage serous . Moderate amount Carmen wound reddened    2. POA Sacral stage 4 pressure injury bone present , slough and eschar foul smelling  8x4x2  copious amount of serous sanguinous  Carmen wound- reddened. 3. POA left Trochanter with eschar and slough , foul smelling. Moderate serous sanguinous drainage =5.5x2x.3cm   Carmen wound intact. 4. POA Left middle lateral back wound slough  5.5X4X0.6cm  Tunneling at 12:00 -0.9cm moderate serous sanguinous foul odor. Psychosocial historypatient's granddaughter has accompanied patient to medical appointments and seems to have had some care of patient dating back to at least 2010.   Patient resides with granddaughter and her  according to CM notes and has required total assist at home. PALLIATIVE DIAGNOSES:   1. Cachexia  2. Hypoalbuminemia   3. Advanced dementia vs late effects of CVA  4. Multiple pressures ulcer including stage IV sacral decubitus  5. Left pleural effusion  6. Cardiomegaly       PLAN:   1. Assessed patient and discussed with bedside RN. No clinical changes overnight. 2. Oral phase remains very slow, she can take some po and pills. Nonverbal for hospital staff. 3. Dr. Elizabeth Teran relayed a plan for patient to go home with home health but to be evaluated by Hospice agency in a couple of days. 4. I reached out and talked to granddaughter Ruel Kaur. She is clear that she will continue to care for her grandmother at home and wants her to have a peaceful natural death in the home setting. She understands with her advanced age and dementia her condition is expected to decline. I made the recommendation to have Hospice set up directly, rather than enroll with home health for just a day or two, so that the service is in place in case of emergent symptoms. This would ensure she wouldn't have to reach out to emergency services and her grandmother could die at home. Bryan Escalona was in agreement. She is familiar with Hospice and comfortable with this plan. 5. Consult placed to  for Hospice evaluate and treat. 6. Code status- DNR. Signed a DDNR for Raffy to sign tomorrow morning when she comes in.   7. Symptoms- none identified. Patient is somnolent but appears physically comfortable at rest.    8. Thank you for the opportunity to participate in the care of Osvaldo Veloz  9. Communicated plan of care with: Palliative Juli GARCIA [facility] Team     GOALS OF CARE / TREATMENT PREFERENCES:     GOALS OF CARE:  Patient/Health Care Proxy Stated Goals:  Other (comment)(return and remain at home, die peacefully in home setting)    TREATMENT PREFERENCES:   Code Status: DNR    Advance Care Planning:  [x] The [facility] Interdisciplinary Team has updated the ACP Navigator with Health Care Decision Maker and Patient Capacity      Primary Decision Maker (Active): Courtney Dietrich - [phone]    Per chart review- no other family listed aside from [facility]. She has self identified herself as \"POA\" over the years. No supporting documentation uploaded to date. Advance Care Planning 11/10/2020   Patient's Healthcare Decision Maker is: Legal Next of Kin   Confirm Advance Directive None   Patient Would Like to Complete Advance Directive Unable       Medical Interventions: Comfort measures   Artificially Administered Nutrition: No feeding tube     Other:    As far as possible, the palliative care team has discussed with patient / health care proxy about goals of care / treatment preferences for patient.      HISTORY:     History obtained from:  Chart review    CHIEF COMPLAINT: pt not able to report- brought in by granddaughter due to foul smelling wounds, concern for infection    HPI/SUBJECTIVE:    The patient is:   [] Verbal and participatory  [x] Non-participatory due to:   Dementia, CVA history, minimally verbal.      Clinical Pain Assessment (nonverbal scale for severity on nonverbal patients):   Clinical Pain Assessment  Severity: 0     Activity (Movement): Lying quietly, normal position    Duration: for how long has pt been experiencing pain (e.g., 2 days, 1 month, years)  Frequency: how often pain is an issue (e.g., several times per day, once every few days, constant)     FUNCTIONAL ASSESSMENT:     Palliative Performance Scale (PPS):  PPS: 20       PSYCHOSOCIAL/SPIRITUAL SCREENING:     Palliative IDT has assessed this patient for cultural preferences / practices and a referral made as appropriate to needs (Cultural Services, Patient Advocacy, Ethics, etc.)    Any spiritual / Jew concerns:  [] Yes /  [x] No    Caregiver Burnout:  [] Yes /  [x] No /  [] No Caregiver Present Anticipatory grief assessment:   [x] Normal  / [] Maladaptive       ESAS Anxiety: Anxiety: 0    ESAS Depression:          REVIEW OF SYSTEMS:     Positive and pertinent negative findings in ROS are noted above in HPI. The following systems were [x] reviewed / [] unable to be reviewed as noted in HPI  Other findings are noted below. Systems: constitutional, ears/nose/mouth/throat, respiratory, gastrointestinal, genitourinary, musculoskeletal, integumentary, neurologic, psychiatric, endocrine. Positive findings noted below. Modified ESAS Completed by: provider   Fatigue: 10 Drowsiness: 6     Pain: 0   Anxiety: 0 Nausea: 0   Anorexia: 10 Dyspnea: 0           Stool Occurrence(s): 0        PHYSICAL EXAM:     From RN flowsheet:  Wt Readings from Last 3 Encounters:   11/11/20 92 lb 11.2 oz (42 kg)   03/09/17 120 lb (54.4 kg)   04/11/14 107 lb (48.5 kg)     Blood pressure (!) 145/71, pulse 91, temperature 97.9 °F (36.6 °C), resp. rate 17, height 5' 3\" (1.6 m), weight 92 lb 11.2 oz (42 kg), SpO2 97 %.     Pain Scale 1: Adult Nonverbal Pain Scale  Pain Intensity 1: 0  Pain Onset 1: chronic  Pain Location 1: Spine, sacral           Last bowel movement, if known:     Constitutional: cachectic elderly AA woman lying still in bed  Eyes: sclerae anicteric  ENMT: no nasal discharge, moist mucous membranes  Cardiovascular: regular rhythm, distal pulses intact  Respiratory: breathing not labored, on RA  Gastrointestinal: difficult to examine due to position  Musculoskeletal: left arm contracted  Skin: warm, dry, buttocks/sacrum bandage c/d/i  Neurologic: eyes open, no tracking or command following, no words spoken during my assessment  Psychiatric: flat affect       HISTORY:     Principal Problem:    Decubitus ulcer of sacral region, stage 4 (HCC) (11/9/2020)    Active Problems:    Essential hypertension, benign (11/3/2010)      Sick sinus syndrome (HCC) ()      Overview: Echo: 10/14/2010 at [facility]      LVEF 65%, mild MR and TR, PAP 50      Pacemaker ()      Overview: Dual chamber [employer] Sensia DOI 10/2010      Pulmonary hypertension ([facility] (11/5/2010)      PAT (paroxysmal atrial tachycardia) ([address] (1/15/2013)      Overview: 5.5 min noted on pacer check      Anemia (11/9/2020)      Dehydration (11/9/2020)      Leukocytosis (11/9/2020)      Sepsis ([address] (11/9/2020)      Failure to thrive in adult (11/9/2020)      Cellulitis of multiple sites of buttock (11/9/2020)      Acute metabolic encephalopathy (79/0/7549)      Severe protein-calorie malnutrition ([address] (11/9/2020)      Pleural effusion (11/9/2020)      Sinus tachycardia (11/9/2020)      Dementia ([address] (11/9/2020)      Seizure disorder (HCC) ()      Glaucoma ()      Hx of completed stroke ()      Past Medical History:   Diagnosis Date    Dizziness     Essential hypertension     Glaucoma     Hx of completed stroke     NSVT (nonsustained ventricular tachycardia) ([facility] 7/10/2012    Pacemaker     Seizure disorder ([facility]     Sick sinus syndrome ([facility]       Past Surgical History:   Procedure Laterality Date    HX PACEMAKER        No family history on file. History reviewed, no pertinent family history. Social History     Tobacco Use    Smoking status: Never Smoker    Smokeless tobacco: Never Used   Substance Use Topics    Alcohol use: No     No Known Allergies   Current Facility-Administered Medications   Medication Dose Route Frequency    vancomycin (VANCOCIN) 750 mg in 0.9% sodium chloride 250 mL (VIAL-MATE)  750 mg IntraVENous Q24H    dextrose 5 % - 0.45% NaCl 1,000 mL with mvi, adult no. 4 with vit K 10 mL, thiamine 879 mg, folic acid 1 mg, potassium chloride 20 mEq infusion   IntraVENous QPM    sodium chloride (NS) flush 5-40 mL  5-40 mL IntraVENous Q8H    sodium chloride (NS) flush 5-40 mL  5-40 mL IntraVENous PRN    acetaminophen (TYLENOL) tablet 650 mg  650 mg Oral Q6H PRN    Or    acetaminophen (TYLENOL) suppository 650 mg  650 mg Rectal Q6H PRN    polyethylene glycol (MIRALAX) packet 17 g  17 g Oral DAILY PRN    cefepime (MAXIPIME) 2 g in sterile water (preservative free) 10 mL IV syringe  2 g IntraVENous Q12H    latanoprost (XALATAN) 0.005 % ophthalmic solution 1 Drop  1 Drop Both Eyes QHS    brimonidine (ALPHAGAN) 0.2 % ophthalmic solution 1 Drop  1 Drop Both Eyes TID    donepeziL (ARICEPT) tablet 10 mg  10 mg Oral QHS    dorzolamide (TRUSOPT) 2 % ophthalmic solution 1 Drop  1 Drop Both Eyes BID    famotidine (PEPCID) tablet 20 mg  20 mg Oral DAILY    metroNIDAZOLE (FLAGYL) IVPB premix 500 mg  500 mg IntraVENous Q12H    morphine injection 1 mg  1 mg IntraVENous Q4H PRN    phenytoin ER (DILANTIN ER) ER capsule 200 mg  200 mg Oral DAILY    phenytoin ER (DILANTIN ER) ER capsule 100 mg  100 mg Oral QHS    sodium chloride (NS) flush 5-10 mL  5-10 mL IntraVENous PRN          LAB AND IMAGING FINDINGS:     Lab Results   Component Value Date/Time    WBC 10.2 11/10/2020 04:30 AM    HGB 7.7 (L) 11/10/2020 04:30 AM    PLATELET 586 55/80/1997 04:30 AM     Lab Results   Component Value Date/Time    Sodium 148 (H) 11/10/2020 04:30 AM    Potassium 4.0 11/10/2020 04:30 AM    Chloride 121 (H) 11/10/2020 04:30 AM    CO2 21 11/10/2020 04:30 AM    BUN 22 (H) 11/10/2020 04:30 AM    Creatinine 0.61 11/11/2020 01:26 AM    Calcium 8.2 (L) 11/10/2020 04:30 AM    Magnesium 2.1 07/10/2012 12:21 PM      Lab Results   Component Value Date/Time    Alk.  phosphatase 64 11/10/2020 04:30 AM    Protein, total 7.2 11/10/2020 04:30 AM    Albumin 1.4 (L) 11/10/2020 04:30 AM    Globulin 5.8 (H) 11/10/2020 04:30 AM     Lab Results   Component Value Date/Time    INR 1.1 03/09/2017 09:01 AM    Prothrombin time 10.6 03/09/2017 09:01 AM      Lab Results   Component Value Date/Time    Iron 25 (L) 11/10/2020 10:48 AM    TIBC 118 (L) 11/10/2020 10:48 AM    Iron % saturation 21 11/10/2020 10:48 AM    Ferritin 741 (H) 11/10/2020 10:48 AM      No results found for: PH, PCO2, PO2  No components found for: [place]   No results found for: CPK, CKMB             Total time:  35m  Counseling / coordination time, spent as noted above:  35m  > 50% counseling / coordination?: y    Prolonged service was provided for  []30 min   []75 min in face to face time in the presence of the patient, spent as noted above. Time Start:   Time End:   Note: this can only be billed with 45033 (initial) or 08502 (follow up). If multiple start / stop times, list each separately.

## 2020-11-11 NOTE — PROGRESS NOTES
Palliative Medicine      Code Status: DNR    Advance Care Planning:  Advance Care Planning 11/10/2020   Patient's Healthcare Decision Maker is: Legal Next of Kin   Confirm Advance Directive None   Patient Would Like to Complete Advance Directive Unable       Patient / Family Encounter Documentation    Participants (names):  Pt, vasiliy Roque by phone, Palliative Medicine (Dr. Gareth Adams, Alex )    Narrative:  Palliative team made supportive visit to pt, no family present. Pt was in bed, eyes were open but pt did not acknowledge team or engage in any way. Bites at best appeared to have been eaten from breakfast tray. Spoke with edelr Kinsey Roque by phone. Granddtr stated she has been caring for pt since pt was [de-identified] yrs old, shared that pt will celebrate her 89th birthday later this week. Granddtr shared that pt has always been a slow eater, does better for granddtr and granddtr's , does not eat as well for great-granddtr or for Medicaid caregivers. Granddtr does not wish to pursue a feeding tube, expressed belief that pt will do better once home, initially stated plan was for home health, followed by hospice \"in two days. \"  After further discussion, granddtr expressed wish to forgo home health and speak directly with hospice agency, stated she does not want pt in and out of the hospital.     Vanessa Collet stated pt signed paperwork in past which appoints granddtr as medical and financial POA; paperwork is not currently on file. Pt has 2 granddtrs and 2 grandsons; children are not involved in her life. Pt already has inpt DNR order in place. Granddtr was already familiar with DDNR form, was aware of importance of posting form in visible location of the home. DDNR form was signed by physician and left in envelope in pt's room for granddtr to sign on pt's behalf.         Psychosocial Issues Identified/ Resilience Factors: Vanessa Collet appears very devoted, shared that she used to bring pt with her to her job in order to keep an eye on pt and assist with her meals. Goals of Care / Plan:  Potential plan is to discharge home with [facility] once arrangements are in place. Discussed with Care Manager, who is reviewing plan with APS (report had been filed due to wounds). SW will continue to follow for support while pt is inpt. Thank you for including Palliative Medicine in the care of Ms. Damien Grey.     oumar Mckeon LCSW, [facility]  [phone])

## 2020-11-11 NOTE — PROGRESS NOTES
[address] Dosing Services: Antimicrobial Stewardship Progress Note 11/11/2020    Consult for antibiotic dosing of Vancomycin by Dr. Leonid Reddy and Cefepime by renal protocol. Pharmacist reviewed antibiotic appropriateness for 80year old , female  for indication of multiple decubitus ulcers s/p I&D, possible OM  Day of Therapy 3    Plan:  Vancomycin therapy:  Start Vancomycin therapy, with loading dose of 1000 (mg) at 0129 11/09/20. Follow with maintenance dose of : by random level. Dose calculated to approximate a therapeutic trough of 15-20 mcg/mL. Last trough level / Plan for level: Patient with low CrCl ~30 ml/min due to age however Scr is at baseline and no apparent CARLY. Trough subtherapeutic on 500 mg Q24 hours and dose increased. Anticipate therapeutic trough on new regimen. Plan for a repeat level in ~3 days. Pharmacy to follow daily and will make changes to dose and/or frequency based on clinical status. Date Dose & Interval Measured (mcg/mL) Extrapolated (mcg/mL)   ?11/11 1000 mg x 1 then 500 mg Q24 hours? ?9.4 ? Css trough (prior to 3rd total dose)   ? ? ? ?   ? ? ? ? Non-Kinetic Antimicrobial Dosing:   Current Regimen:   Cefepime 2 grams IV q12h  Recommendation: Continue the same. CrCl 30-60 ml/min. Other Antimicrobial  (not dosed by pharmacist)   Metronidazole 500 mg Q12 hours   Cultures     11/10 Blood (paired, surveillance): NGTD, Prelim  11/8 Blood: 1/2 bottles flagged pos for GNR; Prelim  11/8 Wound: Light probable pseudomonas, light mixed enteric kvng, 4+ GNR, Prelim   Serum Creatinine     Lab Results   Component Value Date/Time    Creatinine 0.61 11/11/2020 01:26 AM       Creatinine Clearance Estimated Creatinine Clearance: 33.3 mL/min (by C-G formula based on SCr of 0.61 mg/dL).      Procalcitonin    Lab Results   Component Value Date/Time    Procalcitonin 0.15 11/09/2020 01:01 AM        Temp   97.6 °F (36.4 °C)    WBC   Lab Results   Component Value Date/Time    WBC 10.2 11/10/2020 04:30 AM       H/H   Lab Results   Component Value Date/Time    HGB 7.7 (L) 11/10/2020 04:30 AM      Platelets Lab Results   Component Value Date/Time    PLATELET 514 66/41/2910 04:30 AM          Thanks,  Pharmacist: ALEKSEY AraujoD Contact information: [phone]

## 2020-11-11 NOTE — PROGRESS NOTES
0422  Crackles on left lungs heard during reassessment. I paged the on call hospitalist to let him know. According to notes, pulmonary has signed the patient off and will not be treating pleural effusion. Dr. Bre Hernandez does not want to diurese pt due to risk for dehydration.

## 2020-11-11 NOTE — PROGRESS NOTES
Problem: Dysphagia (Adult)  Goal: *Acute Goals and Plan of Care (Insert Text)  Description: Swallowing goals initiated 11-9-20:  1) tolerate sips and purees in small amounts without s/s aspiration   Outcome: Not Progressing Towards Goal   SPEECH LANGUAGE PATHOLOGY DYSPHAGIA TREATMENT  Patient: Cheyenne Kaba (69 y.o. female)  Date: 11/11/2020  Diagnosis: Sepsis ([address] [A41.9]  Decubitus ulcer of sacral region, stage 4 (HCC) [L89.154]  Cellulitis of multiple sites of buttock [L03.317]  Dehydration [E86.0]  Failure to thrive in adult [R62.7]  Leukocytosis [D72.829]   Decubitus ulcer of sacral region, stage 4 (HCC)       Precautions: aspiration Skin, DNR, Fall    ASSESSMENT:  Minimal PO intake. She does take meds, but in extended period. Same with small amounts of PO. 20 min for 4 bites. PLAN:  Recommendations and Planned Interventions:  Continue to offer dysphagia 1, nectars when receptive. Palliative care addressing goals of care. Patient continues to benefit from skilled intervention to address the above impairments. Continue treatment per established plan of care. Discharge Recommendations: To Be Determined     SUBJECTIVE:   Patient nonverbal and did not appear particularly interested in PO. No family present. RN reports she took pills crushed in 10 min. .    OBJECTIVE:   Cognitive and Communication Status:  Neurologic State: Alert  Orientation Level: Unable to verbalize  Cognition: Decreased attention/concentration, Decreased command following  Perception: Appears intact  Perseveration: No perseveration noted  Safety/Judgement: Lack of insight into deficits  Dysphagia Treatment:  Oral Assessment:     P.O. Trials:  Patient Position: upright in bed;sidelying due to wounds. Vocal quality prior to P.O.: Other (comment)(nonverbal today)  Consistency Presented: Nectar thick liquid;Puree  How Presented: Self-fed/presented;Straw;Spoon; Successive swallows         ORAL PHASE:   Minimal oral opening. Frontal leakage  Poor a-p transit.some chewing. Slow clearance. Moderate oral holding at times. Sweep with toothette revealed mild oral residue. PHARYNGEAL PHASE:   No overt s/s aspiration, but high risk for aspiration due to feeder status. RN reports 10 min to take meds in ap sauce. SLP gave her 4 bites in 20min                                        Exercises:  Laryngeal Exercises:                                                                                                                                   Pain:  Pain Scale 1: Adult Nonverbal Pain Scale  Pain Intensity 1: 0       After treatment:   Patient left in no apparent distress in bed and Nursing notified    COMMUNICATION/EDUCATION:   Patient was educated regarding her deficit(s) of dysphagia  as this relates to her diagnosis of FTT. She demonstrated Guarded understanding as evidenced by nonverbal status. .    The patient's plan of care including recommendations, planned interventions, and recommended diet changes were discussed with: Registered nurse and palliative .      Tabatha Yan SLP  Time Calculation: 15 mins

## 2020-11-11 NOTE — PROGRESS NOTES
1900 Bedside and Verbal shift change report given to Natalia Winchester RN (oncoming nurse) by Kaleb Sol RN (offgoing nurse). Report included the following information SBAR, Intake/Output, MAR and Recent Results.

## 2020-11-11 NOTE — PROGRESS NOTES
Assessed pt this am.  No family at bedside. Asked RN to page me should family arrive, otherwise will make another attempt to call granddaughter this afternoon.

## 2020-11-11 NOTE — PROGRESS NOTES
Transition Plan of Care  RUR 18%    4:23 PM  [facility] Accepted. I told Jessica Bennett that CM will follow up tomorrow once we have spoken with [facility] with APS to ensure a safe discharge plan. I also updated Jay Daniels with [facility] [phone] with updates on the pt. CLARISSA Pisano      Plan:  1. Monitor patient's response to treatment. 2. Hospice consult noted-Referral sent to [place] [phone] in [facility]-await for response. 3. I called  Deonte Murrieta with [place] [facility] [phone] [facility] to give her an update on pt. Waiting to hear back from her to see if the patient can go back home with family. 4. Granddaughter would like to transport at discharge. 5. Case Management to follow.     CLARISSA Pisano

## 2020-11-11 NOTE — PROGRESS NOTES
Vancomycin trough, drawn 25.2 hours post-dose, was 9.0 mcg/ml. This extrapolates to 9.4 mcg/ml, which is below goal.  Will increase dose to 750 mg IV q24H, with next dose at 2000 11/11/20. This predicts a trough of 16.5 mcg/ml and an AUC of 650.

## 2020-11-11 NOTE — DISCHARGE SUMMARY
Physician Discharge Summary     Patient ID:  Rhoda Irizarry  [id number]  80 y.o.  11/13/1926    Admit date: 11/8/2020    Discharge date of service and time: 11/11/2020    Admission Diagnoses: Sepsis ([facility] [address] [A41.9]  Decubitus ulcer of sacral region, stage 4 ([facility] [address] [L89.154]  Cellulitis of multiple sites of buttock [L03.317]  Dehydration [E86.0]  Failure to thrive in adult [R62.7]  Leukocytosis [D72.829]    Discharge Diagnoses:    Principal Diagnosis   Decubitus ulcer of sacral region, stage 4 [facility]                                             Hospital Course and other diagnoses  Decubitus ulcer of sacral region, stage 4 / Cellulitis of multiple sites of buttock - POA, due to immobility and malnutrition. Wound, ID and surgery consulted. Follow cx. Surgery did bedside debridement. Unlikely to heal, ID consult favor comfort measures. For now cefepime, vanco and flagyl. Palliative consulted for hospice at home at discharge, vs LTC hospice.     Sepsis / GNR bacteremia / Leukocytosis / Sinus tachycardia - POA due to ulcer. GNR so far on wound cx, a blood cx also with GNR. ID management of Abx as above     Acute metabolic encephalopathy / Hx of completed stroke / Dementia - POA, unclear baseline, but severe dementia on interview. She does not have capacity. In setting of other issues, hospice will be the only reasonable discharge option. Granddaughter Jeffrey Vela agreed dring my phone conversation, and is willing to speak with hospice about DC to Home Hospice. Patient will no longer benefit from ASA and simvastatin for CVA protection. Continue donepezil and phenytoin, phenytoin had low level.     Sick sinus syndrome / PAT (paroxysmal atrial tachycardia) / Pacemaker - POA, stable. Not on rate control and not on anticoagulation.     Severe protein-calorie malnutrition / Failure to thrive in adult / Hypoalbuminemia - POA, likely poor PO intake due to dementia, exacerbated by sepsis.  Speech consulted, and noted severe slow intake.     Anemia - POA, due to chronic disease and sepsis, and will worsen with hydration. Monitor and transfuse as needed. Check serologies.     Pleural effusion / Pulmonary hypertension - POA, effusion worsening per radiology report. Unclear etiology. Cannot diurese in setting of dehydration. Pulmonary consulted, but will not tap as she is likely going to hospice. Would only consider palliative tap.     Glaucoma - Continue trusopt, and brimonidine drops     GERD - Pepcid     Essential hypertension, benign - Hold norvasc and lisinopril     Dehydration - hydrate and follow. PCP: Bronson Haor MD    Consults: Pulmonary/Intensive care, ID, General Surgery, Palliative Care and Hospice    Significant Diagnostic Studies: See Hospital Course    Discharged home in stable condition. Discharge Exam:  /80 (BP 1 Location: Left arm, BP Patient Position: At rest)   Pulse 94   Temp 97.6 °F (36.4 °C)   Resp 18   Ht 5' 3\" (1.6 m)   Wt 42 kg (92 lb 11.2 oz)   SpO2 97%   BMI 16.42 kg/m²      Gen:  Frail, cachectic, in no acute distress  HEENT:  Pink conjunctivae, PERRL, hearing intact to voice, drt mucous membranes  Neck:  Supple, without masses, thyroid non-tender  Resp:  No accessory muscle use, clear breath sounds without wheezes rales or rhonchi  Card:  No murmurs, tachycardic S1, S2 without thrills, bruits or peripheral edema  Abd:  Soft, non-tender, non-distended, normoactive bowel sounds are present, no mass  Lymph:  No cervical or inguinal adenopathy  Musc:  No cyanosis or clubbing  Skin:  decubitus ulcers, skin turgor is poor   Neuro:  Cranial nerves are grossly intact, general motor weakness, does not follow commands appropriately  Psych:  Poor  Insight, not oriented    Patient Instructions:   Current Discharge Medication List      CONTINUE these medications which have NOT CHANGED    Details   ! ! phenytoin ER (DILANTIN ER) 100 mg ER capsule Take 200 mg by mouth daily.  Phenytoin 200 mg AM and 100 mg PM      !! phenytoin ER (DILANTIN ER) 100 mg ER capsule Take 100 mg by mouth nightly. Phenytoin 200 mg AM and 100 mg PM      latanoprost (XALATAN) 0.005 % ophthalmic solution Administer 1 Drop to both eyes nightly. brimonidine (ALPHAGAN) 0.2 % ophthalmic solution Administer 1 Drop to both eyes three (3) times daily. senna-docusate (Senna-S) 8.6-50 mg per tablet Take 1 Tab by mouth every evening. dorzolamide (TRUSOPT) 2 % ophthalmic solution Administer 1 Drop to both eyes two (2) times a day. donepezil (ARICEPT) 10 mg tablet Take 10 mg by mouth nightly. !! - Potential duplicate medications found. Please discuss with provider. STOP taking these medications       simvastatin (ZOCOR) 20 mg tablet Comments:   Reason for Stopping:         OTHER Comments:   Reason for Stopping:         aspirin 81 mg chewable tablet Comments:   Reason for Stopping:         calcium carbonate-vitamin D3 600 mg(1,500mg) -800 unit tab Comments:   Reason for Stopping:         cyanocobalamin, vitamin B-12, 5,000 mcg TbDi Comments:   Reason for Stopping:         cholecalciferol (VITAMIN D3) (5000 Units/125 mcg) tab tablet Comments:   Reason for Stopping:         famotidine (PEPCID) 20 mg tablet Comments:   Reason for Stopping:         bimatoprost (LUMIGAN) 0.03 % ophthalmic drops Comments:   Reason for Stopping:             Activity: Bedrest  Diet: Comfort feeding  Wound Care: Keep wound clean and dry, Reinforce dressing PRN and As directed    Follow-up with your PCP and hospice in a few weeks.   Follow-up tests/labs - none    Signed:  Cristina Crespo MD  11/11/2020  11:46 AM

## 2020-11-11 NOTE — ACP (ADVANCE CARE PLANNING)
Advance Care Planning     Advance Care Planning Activator (Inpatient)  Conversation Note      Date of ACP Conversation: 11/11/20     Conversation Conducted with:  Healthcare Decision Maker Named in Advance Directive or Healthcare Power of  (Copy not on file):  Keenan Roxana [facility]    ACP Activator: Blayne Greenwood LCSW along with Palliative Medicine Physician Dr. Rena Kingsley:    Current Designated Health Care Decision Maker:   Primary Decision Maker (Active): Domi Barrfreida - [phone]     Care Preferences    Ventilation: \"If you were in your present state of health and suddenly became very ill and were unable to breathe on your own, what would your preference be about the use of a ventilator (breathing machine) if it were available to you? \"      If patient would desire the use of a ventilator (breathing machine), answer \"yes\", if not \"no\": No    \"If your health worsens and it becomes clear that your chance of recovery is unlikely, what would your preference be about the use of a ventilator (breathing machine) if it were available to you? \"     Would the patient desire the use of a ventilator (breathing machine)? No      Resuscitation  \"CPR works best to restart the heart when there is a sudden event, like a heart attack, in someone who is otherwise healthy. Unfortunately, CPR does not typically restart the heart for people who have serious health conditions or who are very sick. \"    \"In the event your heart stopped as a result of an underlying serious health condition, would you want attempts to be made to restart your heart (answer \"yes\" for attempt to resuscitate) or would you prefer a natural death (answer \"no\" for do not attempt to resuscitate)? \"  No    [x] Yes  [] No   Educated Patient / Jodi Hawk regarding differences between Advance Directives and portable DNR orders.     Length of ACP Conversation in minutes:  20 mins    Conversation Outcomes:  [x] ACP discussion completed  [] Existing advance directive reviewed with patient; no changes to patient's previously recorded wishes   [] New Advance Directive completed   [x] Portable Do Not Resuscitate prepared for family review and signature  [] POLST/POST/MOLST/MOST prepared for Provider review and signature    Follow-up plan:  Pt will possibly be discharged home with hospice support; DDNR form was left in room for family to sign on pt's behalf.     [x] This note routed to one or more involved healthcare providers

## 2020-11-11 NOTE — PROGRESS NOTES
[facility]    Medical Progress Note      NAME: Michell Severe   :  1926  MRM:  [id number]    Date/Time of service 2020  9:25 AM          Assessment and Plan:     Decubitus ulcer of sacral region, stage 4 / Cellulitis of multiple sites of buttock - POA, due to immobility and malnutrition. Wound, ID and surgery consulted. Follow cx. Surgery did bedside debridement. Unlikely to heal, ID consult favor comfort measures. For now cefepime, vanco and flagyl. Palliative consulted for hospice at home at discharge, vs LTC hospice. Sepsis / GNR bacteremia / Leukocytosis / Sinus tachycardia - POA due to ulcer. GNR so far on wound cx, a blood cx also with GNR. ID management of Abx as above    Acute metabolic encephalopathy / Hx of completed stroke / Dementia - POA, unclear baseline, but severe dementia on interview. She does not have capacity. In setting of other issues, hospice will be the only reasonable discharge option. Granddaughter Perri Crane agreed dring my phone conversation, and is willing to speak with hospice about DC to Home Hospice. Patient will no longer benefit from ASA and simvastatin for CVA protection. Continue donepezil and phenytoin, phenytoin had low level. Sick sinus syndrome / PAT (paroxysmal atrial tachycardia) / Pacemaker - POA, stable. Not on rate control and not on anticoagulation. Severe protein-calorie malnutrition / Failure to thrive in adult / Hypoalbuminemia - POA, likely poor PO intake due to dementia, exacerbated by sepsis. Speech consulted, and noted severe slow intake. Anemia - POA, due to chronic disease and sepsis, and will worsen with hydration. Monitor and transfuse as needed. Check serologies. Pleural effusion / Pulmonary hypertension - POA, effusion worsening per radiology report. Unclear etiology. Cannot diurese in setting of dehydration. Pulmonary consulted, but will not tap as she is likely going to hospice.  Would only consider palliative tap. Glaucoma - Continue trusopt, and brimonidine drops    GERD - Pepcid    Essential hypertension, benign - Hold norvasc and lisinopril    Dehydration - hydrate and follow. Chief Complaint:  Cannot obtain due to dementia    ROS:  (bold if positive, if negative)    Not Tolerating PT  Not Tolerating Diet        Objective:     Last 24hrs VS reviewed since prior progress note.  Most recent are:    Visit Vitals  /80 (BP 1 Location: Left arm, BP Patient Position: At rest)   Pulse 94   Temp 97.6 °F (36.4 °C)   Resp 18   Ht 5' 3\" (1.6 m)   Wt 42 kg (92 lb 11.2 oz)   SpO2 97%   BMI 16.42 kg/m²     SpO2 Readings from Last 6 Encounters:   11/11/20 97%   03/09/17 100%            Intake/Output Summary (Last 24 hours) at 11/11/2020 1029  Last data filed at 11/11/2020 0700  Gross per 24 hour   Intake 1210 ml   Output 530 ml   Net 680 ml        Physical Exam:    Gen:  Frail, cachectic, in no acute distress  HEENT:  Pink conjunctivae, PERRL, hearing intact to voice, drt mucous membranes  Neck:  Supple, without masses, thyroid non-tender  Resp:  No accessory muscle use, clear breath sounds without wheezes rales or rhonchi  Card:  No murmurs, tachycardic S1, S2 without thrills, bruits or peripheral edema  Abd:  Soft, non-tender, non-distended, normoactive bowel sounds are present, no mass  Lymph:  No cervical or inguinal adenopathy  Musc:  No cyanosis or clubbing  Skin:  decubitus ulcers, skin turgor is poor   Neuro:  Cranial nerves are grossly intact, general motor weakness, does not follow commands appropriately  Psych:  Poor  Insight, not oriented    Telemetry reviewed:   normal sinus rhythm  __________________________________________________________________  Medications Reviewed: (see below)  Medications:     Current Facility-Administered Medications   Medication Dose Route Frequency    vancomycin (VANCOCIN) 750 mg in 0.9% sodium chloride 250 mL (VIAL-MATE)  750 mg IntraVENous Q24H    dextrose 5 % - 0.45% NaCl 1,000 mL with mvi, adult no. 4 with vit K 10 mL, thiamine 758 mg, folic acid 1 mg, potassium chloride 20 mEq infusion   IntraVENous QPM    sodium chloride (NS) flush 5-40 mL  5-40 mL IntraVENous Q8H    sodium chloride (NS) flush 5-40 mL  5-40 mL IntraVENous PRN    acetaminophen (TYLENOL) tablet 650 mg  650 mg Oral Q6H PRN    Or    acetaminophen (TYLENOL) suppository 650 mg  650 mg Rectal Q6H PRN    polyethylene glycol (MIRALAX) packet 17 g  17 g Oral DAILY PRN    cefepime (MAXIPIME) 2 g in sterile water (preservative free) 10 mL IV syringe  2 g IntraVENous Q12H    latanoprost (XALATAN) 0.005 % ophthalmic solution 1 Drop  1 Drop Both Eyes QHS    brimonidine (ALPHAGAN) 0.2 % ophthalmic solution 1 Drop  1 Drop Both Eyes TID    donepeziL (ARICEPT) tablet 10 mg  10 mg Oral QHS    dorzolamide (TRUSOPT) 2 % ophthalmic solution 1 Drop  1 Drop Both Eyes BID    famotidine (PEPCID) tablet 20 mg  20 mg Oral DAILY    metroNIDAZOLE (FLAGYL) IVPB premix 500 mg  500 mg IntraVENous Q12H    morphine injection 1 mg  1 mg IntraVENous Q4H PRN    phenytoin ER (DILANTIN ER) ER capsule 200 mg  200 mg Oral DAILY    phenytoin ER (DILANTIN ER) ER capsule 100 mg  100 mg Oral QHS    sodium chloride (NS) flush 5-10 mL  5-10 mL IntraVENous PRN        Lab Data Reviewed: (see below)  Lab Review:     Recent Labs     11/10/20  0430 11/09/20  0101   WBC 10.2 18.3*   HGB 7.7* 7.7*   HCT 25.9* 24.7*    359     Recent Labs     11/11/20  0126 11/10/20  0430 11/09/20  0101   NA  --  148* 145  143   K  --  4.0 3.7  3.8   CL  --  121* 118*  112*   CO2  --  21 22  26   GLU  --  68 102*  102*   BUN  --  22* 25*  26*   CREA 0.61 0.62 0.72  0.81   CA  --  8.2* 8.3*  8.9   ALB  --  1.4* 1.5*  1.7*   TBILI  --  0.3 0.3  0.3   ALT  --  20 23  28     No results found for: GLUCPOC  No results for input(s): PH, PCO2, PO2, HCO3, FIO2 in the last 72 hours.   No results for input(s): INR, INREXT, INREXT in the last 72 hours. All Micro Results     Procedure Component Value Units Date/Time    CULTURE, BLOOD, PAIRED [[id number]] Collected:  11/10/20 1048    Order Status:  Completed Specimen:  Blood Updated:  11/11/20 0626     Special Requests: NO SPECIAL REQUESTS        Culture result: NO GROWTH AFTER 18 HOURS       CULTURE, BLOOD [[id number]] Collected:  11/08/20 2011    Order Status:  Completed Specimen:  Blood Updated:  11/11/20 0626     Special Requests: NO SPECIAL REQUESTS        Culture result: NO GROWTH 3 DAYS       CULTURE, WOUND Aleene Schultze STAIN [[id number]]  (Abnormal) Collected:  11/09/20 0101    Order Status:  Completed Specimen:  Sacral Wound Updated:  11/10/20 1127     Special Requests: NO SPECIAL REQUESTS        GRAM STAIN OCCASIONAL WBCS SEEN         4+ GRAM NEGATIVE RODS        Culture result:       LIGHT PROBABLE PSEUDOMONAS SPECIES            LIGHT  MIXED ENTERIC DAVID       CULTURE, BLOOD [[id number]]  (Abnormal) Collected:  11/09/20 0101    Order Status:  Completed Specimen:  Blood Updated:  11/10/20 0953     Special Requests: NO SPECIAL REQUESTS        Culture result:       GRAM NEGATIVE RODS GROWING IN 1 OF 2 BOTTLES DRAWN (SITE = R FEM)                  REMAINING BOTTLE(S) HAS/HAVE NO GROWTH SO FAR          MICRO TRACKING [[id number]] Collected:  11/09/20 0101    Order Status:  Completed Updated:  11/10/20 0306    URINE CULTURE HOLD SAMPLE [[id number]] Collected:  11/08/20 2015    Order Status:  Canceled Specimen:  Urine           Other pertinent lab: none    Total time spent with patient: 39 Minutes,I personally rounded  from 9 AM to 9:45 AM , in addition to the time spent by my partner, Dr Anam Beasley, earlier today. I personally reviewed chart, notes, data and current medications in the medical record. I have personally examined and treated the patient at bedside during this period. I personally made additional diagnoses, placed additional orders and had additional discussions.                  Care Plan discussed with: Patient, Nursing Staff, Consultant/Specialist and >50% of time spent in counseling and coordination of care    Discussed:  Care Plan and D/C Planning    Prophylaxis:  H2B/PPI    Disposition:  hospice           ___________________________________________________    Attending Physician: Albert Smith MD

## 2020-11-11 NOTE — ROUTINE PROCESS
Bedside and Verbal shift change report given to Onofre Ernandez RN (oncoming nurse) by Behzad Phelps RN (offgoing nurse). Report included the following information SBAR, Kardex, Intake/Output, MAR, Accordion and Cardiac Rhythm NSR.

## 2020-11-11 NOTE — OP NOTES
[facility] 79  OPERATIVE REPORT    Name:  Nan Hayden  MR#:  [id number]  :  1926  ACCOUNT #:  [de-identified]  DATE OF SERVICE:  11/10/2020    PREOPERATIVE DIAGNOSIS:  Decubitus ulcer. POSTOPERATIVE DIAGNOSIS:  Decubitus ulcer. PROCEDURE PERFORMED:  Bedside sharp debridement of two sacral decubitus, one in the sacrum, one in the left buttock area. SURGEON:  Henry Velazquez MD    ASSISTANT:  None. ANESTHESIA:  none    COMPLICATIONS:  None. SPECIMENS REMOVED:  None. IMPLANTS:  None. ESTIMATED BLOOD LOSS:  None. Consent was obtained from the power of  which is the patient's granddaughter. PROCEDURE:  The patient was at the bedside, laid flat. Assistants proceeded to place the patient on a left side fashion. Her dressings were removed and the left buttock decubitus, which was already stage IV had some necrotic tissue which was removed sharply approximately around 30 cm, cubed. Once we got to an area where the patient started feeling,  some bleeding was obtained, we then proceeded to hold some pressure. No further debridement was done in that area. This was packed. I then turned my attention to the sacral decubitus ulcer and this area again had some necrotic tissue which was debrided sharply. Again approximately an area of 20 squared cm and once we got to some bleeding tissue in the area where the patient had sensation, we stopped. I packed the wound with dressing and placed the patient back into weight off bearing position on that side. The patient tolerated the procedure well.       Kade Rubio MD      [id number]  D:  11/10/2020 12:30  T:  11/10/2020 23:13  JOB #:  [id number]

## 2020-11-12 VITALS
WEIGHT: 81.2 LBS | DIASTOLIC BLOOD PRESSURE: 70 MMHG | BODY MASS INDEX: 14.39 KG/M2 | RESPIRATION RATE: 19 BRPM | HEIGHT: 63 IN | OXYGEN SATURATION: 97 % | TEMPERATURE: 96.5 F | HEART RATE: 99 BPM | SYSTOLIC BLOOD PRESSURE: 121 MMHG

## 2020-11-12 LAB
COMMENT, HOLDF: NORMAL
CREAT SERPL-MCNC: 0.64 MG/DL (ref 0.55–1.02)
DATE LAST DOSE: ABNORMAL
REPORTED DOSE,DOSE: ABNORMAL UNITS
REPORTED DOSE/TIME,TMG: ABNORMAL
SAMPLES BEING HELD,HOLD: NORMAL
VANCOMYCIN TROUGH SERPL-MCNC: 17.2 UG/ML (ref 5–10)

## 2020-11-12 PROCEDURE — 2709999900 HC NON-CHARGEABLE SUPPLY

## 2020-11-12 PROCEDURE — 80202 ASSAY OF VANCOMYCIN: CPT

## 2020-11-12 PROCEDURE — 82565 ASSAY OF CREATININE: CPT

## 2020-11-12 PROCEDURE — 74011250637 HC RX REV CODE- 250/637: Performed by: INTERNAL MEDICINE

## 2020-11-12 PROCEDURE — 36415 COLL VENOUS BLD VENIPUNCTURE: CPT

## 2020-11-12 PROCEDURE — 74011000250 HC RX REV CODE- 250: Performed by: HOSPITALIST

## 2020-11-12 PROCEDURE — 74011250636 HC RX REV CODE- 250/636: Performed by: HOSPITALIST

## 2020-11-12 PROCEDURE — 74011250636 HC RX REV CODE- 250/636: Performed by: INTERNAL MEDICINE

## 2020-11-12 RX ADMIN — BRIMONIDINE TARTRATE 1 DROP: 2 SOLUTION OPHTHALMIC at 10:18

## 2020-11-12 RX ADMIN — FAMOTIDINE 20 MG: 20 TABLET, FILM COATED ORAL at 10:18

## 2020-11-12 RX ADMIN — CEFEPIME 2 G: 2 INJECTION, POWDER, FOR SOLUTION INTRAVENOUS at 01:35

## 2020-11-12 RX ADMIN — DORZOLAMIDE HYDROCHLORIDE 1 DROP: 20 SOLUTION/ DROPS OPHTHALMIC at 10:18

## 2020-11-12 RX ADMIN — METRONIDAZOLE 500 MG: 500 INJECTION, SOLUTION INTRAVENOUS at 10:18

## 2020-11-12 RX ADMIN — PHENYTOIN SODIUM 200 MG: 100 CAPSULE ORAL at 10:18

## 2020-11-12 NOTE — PROGRESS NOTES
2145  Attempted to give PO meds in apple sauce, aricept (crushed) and dilantin (capsule opened in apple sauce per pharmacist approval). Pt able to swallow some apple sauce containing the medicine but then started pocketing apple sauce and stopped swallowing. Had to discontinue administration due to risk for aspiration. Pt only received partial amount of PO meds. Notified Dr. Dulce Nolasco, on call hospitalist. Pocketed apple sauce suctioned to prevent aspiration.

## 2020-11-12 NOTE — PROGRESS NOTES
[address] Dosing Services: Antimicrobial Stewardship Progress Note 11/12/2020    Consult for antibiotic dosing of Vancomycin by Dr. Jakob Olivera and Cefepime by renal protocol. Pharmacist reviewed antibiotic appropriateness for 80year old , female  for indication of multiple decubitus ulcers s/p I&D, possible OM  Day of Therapy 4    Plan:  Vancomycin therapy:  Current dose 750 mg Q24 hours  Dose calculated to approximate a therapeutic trough of 15-20 mcg/mL. Last trough level / Plan for level: Trough subtherapeutic on 500 mg Q24 hours and dose increased. Renal function stable. Vancomycin level drawn this AM of 17.2 mcg/ml is an 11 hour random level and not a trough, however this level would indicate patient may be subtherapeutic on every 24 hours regimen. Will adjust vancomycin dose to 500 mg Q16 hours which indicates a therapeutic trough of 16-17 mcg/ml and  based on the previous levels. Conservative dosing given age and low body weight. Expect patient would be supratherapeutic on a Q12 hour regimen. Pharmacy to follow daily and will make changes to dose and/or frequency based on clinical status. If patient remains in the hospital, would recommend de-escalate vancomycin as cultures finalize given gram negative/pseudomonas results    Date Dose & Interval Measured (mcg/mL) Extrapolated (mcg/mL)   ?11/11 1000 mg x 1 then 500 mg Q24 hours? ?9.4 ? Css trough (prior to 3rd total dose)   ? 11/12 750 mg Q24 hours? ?17.2 ~11 hour random level post evening dose? ? ? ? ? Non-Kinetic Antimicrobial Dosing:   Current Regimen:   Cefepime 2 grams IV q12h  Recommendation: Continue the same. CrCl 30-60 ml/min. Renal function borderline for dose adjustment.     Other Antimicrobial  (not dosed by pharmacist)   Metronidazole 500 mg Q12 hours   Cultures     11/10 Blood (paired, surveillance): NGTD, Prelim  11/8 Blood: GNR 1/2 bottles; Prelim  11/8 Wound: Light probable pseudomonas, light mixed enteric kvng, 4+ GNR, Prelim   Serum Creatinine     Lab Results   Component Value Date/Time    Creatinine 0.64 11/12/2020 07:15 AM       Creatinine Clearance Estimated Creatinine Clearance: 29.2 mL/min (by C-G formula based on SCr of 0.64 mg/dL).      Procalcitonin    Lab Results   Component Value Date/Time    Procalcitonin 0.15 11/09/2020 01:01 AM        Temp   (!) 96.5 °F (35.8 °C)    WBC   Lab Results   Component Value Date/Time    WBC 10.2 11/10/2020 04:30 AM       H/H   Lab Results   Component Value Date/Time    HGB 7.7 (L) 11/10/2020 04:30 AM      Platelets Lab Results   Component Value Date/Time    PLATELET 260 65/91/6725 04:30 AM          Thanks,  Pharmacist: Signed Bryant Ibrahim PHARMD Contact information: [phone]

## 2020-11-12 NOTE — PROGRESS NOTES
11:59 AM  CM received notification [facility] will  pt at 12:15PM. Notified [facility], granddaughter Madelaine Cole and nursing. Placed 2nd IMM letter in dc paperwork and in chart. Explained IMM letter to granddaughter who is in agreement with [facility]. No further dc needs. Gricel Kowalsik    11:18 AM  CM spoke to APS, if pt goes home with hospice today, they will see pt in home tomorrow. CM called and spoke to pt's granddaughter and discussed discharge, she would like her to come home today with hospice and would like transport set up. Asked if she would consider SNF placement for more care due to needs but granddaughter declined and wants her to come home with hospice. CM called and left a message with APSOrly Dougherty to let her know pt will be going home today. CM will set up transport and inform [facility] of plan for dc. Gricel Kowalski    8:30 AM  CM noted dc order. Reviewed EMR. Previous CM has set up home hospice. Awaiting call back from APS-Bindu Johansen. CM left message this am and requested a return call to ask if pt is able to go home today with her granddaughter, awaiting response.  Gricel Kowalski

## 2020-11-12 NOTE — DISCHARGE SUMMARY
Physician Discharge Summary     Patient ID:  Rhoda Irizarry  [id number]  80 y.o.  11/13/1926    Admit date: 11/8/2020    Discharge date: 11/12/2020    Admission Diagnoses: Sepsis ([address] [A41.9]  Decubitus ulcer of sacral region, stage 4 ([address] [L89.154]  Cellulitis of multiple sites of buttock [L03.317]  Dehydration [E86.0]  Failure to thrive in adult [R62.7]  Leukocytosis [D72.829]    Principal Discharge Diagnoses:    Sacral decub  FTT    OTHER PROBLEMS ADDRESSEDS  Principal Diagnosis Decubitus ulcer of sacral region, stage 4 (HCC)                                            Principal Problem:    Decubitus ulcer of sacral region, stage 4 (HCC) (11/9/2020)    Active Problems:    Essential hypertension, benign (11/3/2010)      Sick sinus syndrome (HCC) ()      Overview: Echo: 10/14/2010 at [facility]      LVEF 65%, mild MR and TR, PAP 50      Pacemaker ()      Overview: Dual chamber [employer] Sensia DOI 10/2010      Pulmonary hypertension ([address] (11/5/2010)      PAT (paroxysmal atrial tachycardia) ([address] (1/15/2013)      Overview: 5.5 min noted on pacer check      Anemia (11/9/2020)      Dehydration (11/9/2020)      Leukocytosis (11/9/2020)      Sepsis ([address] (11/9/2020)      Failure to thrive in adult (11/9/2020)      Cellulitis of multiple sites of buttock (11/9/2020)      Acute metabolic encephalopathy (33/4/9185)      Severe protein-calorie malnutrition ([address] (11/9/2020)      Pleural effusion (11/9/2020)      Sinus tachycardia (11/9/2020)      Dementia ([address] (11/9/2020)      Seizure disorder (HCC) ()      Glaucoma ()      Hx of completed stroke ()       Patient Active Problem List   Diagnosis Code    Essential hypertension, benign I10    Sick sinus syndrome ([address] I49.5    Pacemaker Z95.0    Pulmonary hypertension ([address] I27.20    PAT (paroxysmal atrial tachycardia) (HCC) I47.1    Anemia D64.9    Dehydration E86.0    Leukocytosis D72.829    Sepsis ([facility] A41.9    Failure to thrive in adult R62.7    Cellulitis of multiple sites of buttock L03.317    Decubitus ulcer of sacral region, stage 4 (HCC) L89.154    Acute metabolic encephalopathy G08.66    Severe protein-calorie malnutrition (HCC) E43    Pleural effusion J90    Sinus tachycardia R00.0    Dementia (HCC) F03.90    Seizure disorder (HCC) G40.909    Glaucoma H40.9    Hx of completed stroke Z86.73         Hospital Course:   Ms. Bernadine Haney is a 81 yo F w/ hx of HTN, SSS s/p PPM, CVA, dementia presenting with AMS, malodorous sacral decubitus wounds, admitted for sepsis. Pt remained stable overnight, and discharged home with home hospice. See note from yesterday from Dr. Aramis Regan for hospital course details. Case was discussed at length with CM re: safe discharge plans, given APS involvement. I was informed that APS would be following up on the case and checking on the patient tomorrow. Pt discharged in terminally ill-condition    Procedures performed: see above    Imaging studies: see above  Ct Head Wo Cont    Result Date: 11/9/2020  IMPRESSION: No acute intracranial process. Large area of encephalomalacia in the right frontal lobe related to remote infarction. Prior craniectomy. Imaging findings consistent with severe chronic microvascular ischemic change. There is a severe degree of cerebral atrophy. Ct Abd Pelv Wo Cont    Result Date: 11/9/2020  IMPRESSION: Sacral decubitus remote wound soft tissue defect that extends to the margin of the inferior pubic ramus on the left. Cardiomegaly with moderate left pleural effusion and left basilar atelectasis. Xr Chest Port    Result Date: 11/8/2020  IMPRESSION: Interval moderate to large left-sided pleural effusion with left basilar atelectasis. Sree Mckenna            PCP: Sonny Cerda MD    Consults: Cardiology, Pulmonary/Intensive care, ID and General Surgery    Discharge Exam:  GEN: elderly, frail, ill-appearing    Disposition: home    Patient Instructions:   Current Discharge Medication List      CONTINUE these medications which have NOT CHANGED    Details   ! ! phenytoin ER (DILANTIN ER) 100 mg ER capsule Take 200 mg by mouth daily. Phenytoin 200 mg AM and 100 mg PM      !! phenytoin ER (DILANTIN ER) 100 mg ER capsule Take 100 mg by mouth nightly. Phenytoin 200 mg AM and 100 mg PM      latanoprost (XALATAN) 0.005 % ophthalmic solution Administer 1 Drop to both eyes nightly. brimonidine (ALPHAGAN) 0.2 % ophthalmic solution Administer 1 Drop to both eyes three (3) times daily. senna-docusate (Senna-S) 8.6-50 mg per tablet Take 1 Tab by mouth every evening. dorzolamide (TRUSOPT) 2 % ophthalmic solution Administer 1 Drop to both eyes two (2) times a day. donepezil (ARICEPT) 10 mg tablet Take 10 mg by mouth nightly. !! - Potential duplicate medications found. Please discuss with provider. STOP taking these medications       simvastatin (ZOCOR) 20 mg tablet Comments:   Reason for Stopping:         OTHER Comments:   Reason for Stopping:         aspirin 81 mg chewable tablet Comments:   Reason for Stopping:         calcium carbonate-vitamin D3 600 mg(1,500mg) -800 unit tab Comments:   Reason for Stopping:         cyanocobalamin, vitamin B-12, 5,000 mcg TbDi Comments:   Reason for Stopping:         cholecalciferol (VITAMIN D3) (5000 Units/125 mcg) tab tablet Comments:   Reason for Stopping:         famotidine (PEPCID) 20 mg tablet Comments:   Reason for Stopping:         bimatoprost (LUMIGAN) 0.03 % ophthalmic drops Comments:   Reason for Stopping:               Activity: See discharge instructions  Diet: See discharge instructions  Wound Care: See discharge instructions    Follow-up Information     Follow up With Specialties Details Why Contact Info    [address]  [phone]          I spent 35 minutes on this discharge.     Signed:  Stephanie Padgett MD  11/12/2020  8:36 AM       .

## 2020-11-12 NOTE — PALLIATIVE CARE DISCHARGE
The Palliative Medicine team was consulted as part of your / your loved one's care in the hospital. Our team is a supportive service; we strive to relieve suffering and improve quality of life. You identified the following goal(s) as your main focus for healthcare: Patient/Health Care Proxy Stated Goals:  Return and remain at home with family We reviewed advance care planning information, which includes the following: 
Advance Care Planning 11/10/2020 Patient's Healthcare Decision Maker is: Legal Next of Kin Confirm Advance Directive None Patient Would Like to Complete Advance Directive Unable Arrangements are being made for you to return home with support from [facility]. You have a Durable Do Not Resuscitate Order in place (your granddaughter will sign on your behalf), which should travel with you. When you are in a facility, this form should be placed on your chart. Once you are home, it is recommended that the [facility] form be placed in a visible location such as on the refrigerator or bedroom door. We reviewed / discussed your code status as: DNR 
   Full Code means perform CPR in the event of cardiac arrest 
   [facility] means do NOT perform CPR in the event of cardiac arrest 
   Partial Code means you have specific preferences, please discuss with your health care team 
   No Order means this issue was not addressed / resolved during your stay Because of the importance of this information, we are providing you with a printed copy to share with other healthcare providers after this hospitalization is complete. If any of the above information is incomplete or incorrect, please contact the Palliative Medicine team at [phone].

## 2020-11-12 NOTE — ROUTINE PROCESS
Bedside and Verbal shift change report given to Hannah Scherer RN (oncoming nurse) by Jen Howard RN (offgoing nurse). Report included the following information SBAR, Kardex, Intake/Output, MAR, Accordion and Cardiac Rhythm NSR.

## 2020-11-12 NOTE — DISCHARGE INSTRUCTIONS
HOSPITALIST DISCHARGE INSTRUCTIONS  NAME: Kayleigh Wong   :  1926   MRN:  [id number]     Date/Time:  2020 8:34 AM    ADMIT DATE: 2020     DISCHARGE DATE: 2020     PRINCIPAL DISCHARGE DIAGNOSES:  sacral decubitus  Failure to thrive    MEDICATIONS:  · It is important that you take the medication exactly as they are prescribed. Note the changes and additions to your medications. Be sure you understand these changes before you are discharged today. · Keep your medication in the bottles provided by the pharmacist and keep a list of the medication names, dosages, and times to be taken in your wallet. · Do not take other medications without consulting your doctor. Pain Management: per above medications    What to do at Home    Recommended diet:  Comfort feeding    Recommended activity: Activity as tolerated, frequent turns in bed    If you experience any of the following symptoms then please call your hospice nurse  Fever, chills, severe abdominal pain, nausea, vomiting, diarrhea, bleeding, severe chest pain, shortness of breath, or other severe concerning symptoms    Follow Up: Follow-up Information     Follow up With Specialties Details Why [address] [place] [address]   [address] [facility]  [phone]            Information obtained by :  I understand that if any problems occur once I am at home I am to contact my physician. I understand and acknowledge receipt of the instructions indicated above.                                                                                                                                            Physician's or R.N.'s Signature                                                                  Date/Time                                                                                                                                              Patient or Representative Signature Date/Time      Patient Education        Hospice: Care Instructions  Your Care Instructions  Hospice care provides medical treatment to relieve symptoms at the end of life. The goal is to keep you comfortable, not to try to cure you. Hospice care does not speed up or lengthen dying. It focuses on easing pain and other symptoms. Hospice caregivers want to enhance your quality of life. Hospice care also offers emotional help and spiritual support when you are dying. It also helps family members care for a loved one who is dying. Hospice care can help you review your life, say important things to family and friends, and explore spiritual issues. Hospice also helps your family and friends deal with their grief after you die. You can use hospice care if your illness cannot be cured and doctors believe you have no more than 6 months to live. You do not need to be confined to a bed or in a hospital to benefit from this type of care. The hospice team includes nurses, counselors, therapists, social workers, pastors, home health aides, and trained volunteers. You can get care in your own home or in a hospice center. Some hospice workers also go to nursing homes or hospitals. How can you care for yourself at home? · Prepare a list of advance directives. These are instructions to your doctor and family members about what kind of care you want if you become unable to speak or express yourself. · Find out if your health insurance covers hospice care. · Find hospice programs in your area. People who can help include your doctor, your state health department, and your insurance company. · Decide what kinds of hospice services you want. It helps to know what you want before you enter a hospice program.  · Think about some questions when preparing for hospice care. ? Who do you want to make decisions about your medical care if you are not able to?  Many people choose their spouse, child, or doctor. ? What are you most afraid of that might happen? You might be afraid of having pain or losing your independence. Let your hospice team know your fears. The team can help you deal with them. ? Where would you prefer to die? Choices include your home, a hospital, or a nursing home. ? Do you want to donate organs when you die? Make sure that your family clearly understands this. ? Do you want any Bahai rites or practices to be done before you die? Let your hospice team know what you want. Where can you learn more? Go to http://www.gray.com/  Enter N859 in the search box to learn more about \"Hospice: Care Instructions. \"  Current as of: December 9, 2019               Content Version: 12.6  © 7495-6986 [employer]. Care instructions adapted under license by [employer] (which disclaims liability or warranty for this information). If you have questions about a medical condition or this instruction, always ask your healthcare professional. [place] [address] any warranty or liability for your use of this information. Information obtained by :  I understand that if any problems occur once I am at home I am to contact my physician. I understand and acknowledge receipt of the instructions indicated above.                                                                                                                                            Physician's or R.N.'s Signature                                                                  Date/Time                                                                                                                                              Patient or Representative Signature                                                          Date/Time    [facility]/ED Visit Follow-Up Instructions/Information    You may have an in home follow up visit set up with [facility] or may wish to contact Note to set-up a visit:    What are we? [facility] is an in-home urgent care service staffed with emergency trained medical teams. We come to your home in a vehicle stocked with medical supplies and technology. An ER physician is always available if needed. When? As a part of your hospital follow-up, an appointment has been/ or can be set up for us to come see you. Usually, this will be 24-72 hours after you leave the hospital or as needed. [facility] is open 7am-9pm, 7 days a week, 365 days a year, including holidays. Why? We know that you cannot always get to your doctor after being in the hospital and that your doctor is not always available when you need them. Once your workup is complete, we'll call in your prescriptions, update your family doctor, and handle billing with your insurance so you can focus on feeling better, faster without leaving home. How much? We accept most major health insurance plans, including Medicaid, Medicare, and Medicare Advantage [address] [place] [address], [facility], and [email or web address]. We also accept: credit, debit, health savings account (HSA), health reimbursement account (HRA) and flexible spending account (FSA) payments. [facility]'s prices compare to conventional urgent care facilities, but we bring the care to you. How to reach us? Getting care is easy- use our mobile nerissa (Note), website ([email or web address]) or call us [phone].

## 2020-11-12 NOTE — PROGRESS NOTES
Patient's granddaughter and POA called and discharge paperwork reviewed with Ms. Carley Crowder. Ms. Salmeron Och understanding. Discharge medications reviewed with LORETTA Lara and appropriate educational materials and side effects teaching were provided. I have reviewed discharge instructions with the [address]. LORETTA Lara verbalized understanding. Leave smart education provided to [address]. LORETTA Lara verbalized understanding. AVS signed by discharge nurse on floor and phone verification written on AVS for chart. Patient's belongings packed, including AVS and patient's glasses and readied for ambulance transport home. LORETTA Lara aware.

## 2020-11-12 NOTE — PROGRESS NOTES
1915 Bedside and Verbal shift change report given to Amanda Ledesma RN (oncoming nurse) by Stella Reddy (offgoing nurse). Report included the following information SBAR, Kardex, Intake/Output, MAR and Recent Results.

## 2020-11-14 LAB
BACTERIA SPEC CULT: ABNORMAL
BACTERIA SPEC CULT: ABNORMAL
BACTERIA SPEC CULT: NORMAL
GRAM STN SPEC: ABNORMAL
GRAM STN SPEC: ABNORMAL
SERVICE CMNT-IMP: ABNORMAL
SERVICE CMNT-IMP: NORMAL

## 2020-11-15 LAB
BACTERIA SPEC CULT: NORMAL
SERVICE CMNT-IMP: NORMAL

## 2020-11-16 NOTE — PROGRESS NOTES
Physician Progress Note      PATIENT:               Lilia Mariee  CSN #:                  [id number]  :                       1926  ADMIT DATE:       2020 7:42 PM  [address] [place] DATE:        2020 12:35 PM  RESPONDING  PROVIDER #:        Inna James MD          QUERY TEXT:    Dear Surgical Team,  Patient admitted with Sepsis with two stage IV Sacral pressure ulcers in which surgery was consulted. Per Op note dated 11/10/20 documentation of debridement is noted. To accurately reflect the procedure performed please document if debridement was excisional or nonexcisional and the deepest depth of tissue removed as down to and including: The medical record reflects the following:    Risk Factors: 80 Yr F admitted with Sepsis with noted Chronic Stage IV sacral ulcers    Clinical Indicators: Patient arrived to the ED with c/o chronic sacral wounds that were possibly infected. Patient noted to be Septic with elevated WBC and tachycardia. General surgery was consulted for possibly debridement needed to wounds. Patient underwent a bedside I&D per OP noted on 11/10/20. Per the OP note, Her dressings were removed and the left buttock decubitus, which was already stage IV had some necrotic tissue which was removed sharply approximately around 30 cm, cubed. Once we got to an area where the patient started feeling,  some bleeding was obtained, we then proceeded to hold some pressure. No further debridement was done in that area. This was packed. Treatment: Surgery consultation and bedside I&D to left buttock decubitus ulcer.     Thank you,  Tc Chester RN, [facility]  [phone]  Options provided:  -- Nonexcisional debridement of skin  -- Excisional debridement of skin  -- Nonexcisional debridement of subcutaneous tissue  -- Excisional debridement of subcutaneous tissue  -- Nonexcisional debridement of fascia  -- Excisional debridement of fascia  -- Nonexcisional debridement of muscle  -- Excisional debridement of muscle  -- Other - I will add my own diagnosis  -- Disagree - Not applicable / Not valid  -- Disagree - Clinically unable to determine / Unknown  -- Refer to Clinical Documentation Reviewer    PROVIDER RESPONSE TEXT:    Excisional debridement of subcutaneous tissue of the left buttock decubitus ulcer was performed during procedure on 11/10/20. Query created by: Corey Klinefelter on 11/16/2020 8:34 AM      QUERY TEXT:    Dear Surgical Team,  Patient admitted with Sepsis with two stage IV Sacral pressure ulcers in which surgery was consulted. Per Op note dated 11/10/20 documentation of debridement is noted. To accurately reflect the procedure performed please document if debridement was excisional or nonexcisional and the deepest depth of tissue removed as down to and including: The medical record reflects the following:    Risk Factors: 80 Yr F admitted with Sepsis with noted Chronic Stage IV sacral ulcers    Clinical Indicators: Patient arrived to the ED with c/o chronic sacral wounds that were possibly infected. Patient noted to be Septic with elevated WBC and tachycardia. General surgery was consulted for possibly debridement needed to wounds. Patient underwent a bedside I&D per OP noted on 11/10/20. Per the OP note, I then turned my attention to the sacral decubitus ulcer and this area again had some necrotic tissue which was debrided sharply. Again approximately an area of 20 squared cm and once we got to some bleeding tissue in the area where the patient had sensation, we stopped. I packed the wound with dressing and placed the patient back into weight off bearing position on that side. The patient tolerated the procedure well. Treatment: Surgery consultation and bedside I&D to the sacral decubitus ulcer.     Thank you,  Anders Nicole RN, [facility]  [phone]  Options provided:  -- Nonexcisional debridement of skin  -- Excisional debridement of skin  -- Nonexcisional debridement of subcutaneous tissue  -- Excisional debridement of subcutaneous tissue  -- Nonexcisional debridement of fascia  -- Excisional debridement of fascia  -- Nonexcisional debridement of muscle  -- Excisional debridement of muscle  -- Other - I will add my own diagnosis  -- Disagree - Not applicable / Not valid  -- Disagree - Clinically unable to determine / Unknown  -- Refer to Clinical Documentation Reviewer    PROVIDER RESPONSE TEXT:    Excisional debridement of subcutaneous tissue of the sacral decubitus ulcer was performed during procedure on 11/10/20. Query created by:  Lobo Luis on 11/16/2020 8:38 AM      Electronically signed by:  Inocencio Salinas MD 11/16/2020 2:02 PM No

## 2020-11-17 LAB
BACTERIA SPEC CULT: ABNORMAL
BACTERIA SPEC CULT: ABNORMAL
SERVICE CMNT-IMP: ABNORMAL

## 2022-03-18 PROBLEM — D64.9 ANEMIA: Status: ACTIVE | Noted: 2020-11-09

## 2022-03-19 PROBLEM — D72.829 LEUKOCYTOSIS: Status: ACTIVE | Noted: 2020-11-09

## 2022-03-19 PROBLEM — E43 SEVERE PROTEIN-CALORIE MALNUTRITION (HCC): Status: ACTIVE | Noted: 2020-11-09

## 2022-03-19 PROBLEM — L89.154 DECUBITUS ULCER OF SACRAL REGION, STAGE 4 (HCC): Status: ACTIVE | Noted: 2020-11-09

## 2022-03-19 PROBLEM — R62.7 FAILURE TO THRIVE IN ADULT: Status: ACTIVE | Noted: 2020-11-09

## 2022-03-19 PROBLEM — R00.0 SINUS TACHYCARDIA: Status: ACTIVE | Noted: 2020-11-09

## 2022-03-19 PROBLEM — A41.9 SEPSIS (HCC): Status: ACTIVE | Noted: 2020-11-09

## 2022-03-19 PROBLEM — G93.41 ACUTE METABOLIC ENCEPHALOPATHY: Status: ACTIVE | Noted: 2020-11-09

## 2022-03-19 PROBLEM — L03.317 CELLULITIS OF MULTIPLE SITES OF BUTTOCK: Status: ACTIVE | Noted: 2020-11-09

## 2022-03-20 PROBLEM — J90 PLEURAL EFFUSION: Status: ACTIVE | Noted: 2020-11-09

## 2022-03-20 PROBLEM — E86.0 DEHYDRATION: Status: ACTIVE | Noted: 2020-11-09

## 2022-03-20 PROBLEM — F03.90 DEMENTIA (HCC): Status: ACTIVE | Noted: 2020-11-09

## 2023-05-12 RX ORDER — DONEPEZIL HYDROCHLORIDE 10 MG/1
10 TABLET, FILM COATED ORAL NIGHTLY
COMMUNITY

## 2023-05-12 RX ORDER — BRIMONIDINE TARTRATE 2 MG/ML
1 SOLUTION/ DROPS OPHTHALMIC 3 TIMES DAILY
COMMUNITY

## 2023-05-12 RX ORDER — PHENYTOIN SODIUM 100 MG/1
100 CAPSULE, EXTENDED RELEASE ORAL
COMMUNITY

## 2023-05-12 RX ORDER — DORZOLAMIDE HCL 20 MG/ML
1 SOLUTION/ DROPS OPHTHALMIC 2 TIMES DAILY
COMMUNITY

## 2023-05-12 RX ORDER — LATANOPROST 50 UG/ML
1 SOLUTION/ DROPS OPHTHALMIC
COMMUNITY

## 2023-05-12 RX ORDER — AMOXICILLIN 250 MG
1 CAPSULE ORAL EVERY EVENING
COMMUNITY